# Patient Record
Sex: FEMALE | Race: WHITE | Employment: FULL TIME | URBAN - METROPOLITAN AREA
[De-identification: names, ages, dates, MRNs, and addresses within clinical notes are randomized per-mention and may not be internally consistent; named-entity substitution may affect disease eponyms.]

---

## 2017-01-13 ENCOUNTER — TELEPHONE (OUTPATIENT)
Dept: FAMILY MEDICINE | Facility: CLINIC | Age: 52
End: 2017-01-13

## 2017-01-13 DIAGNOSIS — Z00.00 ROUTINE GENERAL MEDICAL EXAMINATION AT A HEALTH CARE FACILITY: Primary | ICD-10-CM

## 2017-01-13 NOTE — TELEPHONE ENCOUNTER
Reason for Call: Request for an order or referral:    Order or referral being requested: fasting labs, thyroid test, dexa    Date needed: before my next appointment (next appt 1/31/17)    Has the patient been seen by the PCP for this problem? YES    Additional comments: Patient is scheduled for a physical 1/31. She would like to come in earlier than that to have labs drawn and would like a Dexascan ordered. She is also wondering if there is anything else that she should have done prior to her appointment. Please call and advise.     Phone number Patient can be reached at: [phone]    Best Time:  anytime    Can we leave a detailed message on this number?  YES    Mary Alvarenga,   [facility]

## 2017-01-17 NOTE — TELEPHONE ENCOUNTER
Called and spoke with pt and she states had a Dexa done at 47 and it was borderline low.  Her mother and sister both have osteoporosis and her sister is on medication.

## 2017-04-21 ENCOUNTER — RADIANT APPOINTMENT (OUTPATIENT)
Dept: MAMMOGRAPHY | Facility: CLINIC | Age: 52
End: 2017-04-21
Attending: INTERNAL MEDICINE
Payer: COMMERCIAL

## 2017-04-21 DIAGNOSIS — Z12.31 VISIT FOR SCREENING MAMMOGRAM: ICD-10-CM

## 2017-04-21 PROCEDURE — 77063 BREAST TOMOSYNTHESIS BI: CPT | Mod: TC

## 2017-04-21 PROCEDURE — G0202 SCR MAMMO BI INCL CAD: HCPCS | Mod: TC

## 2017-06-23 ENCOUNTER — OFFICE VISIT (OUTPATIENT)
Dept: RHEUMATOLOGY | Facility: CLINIC | Age: 52
End: 2017-06-23
Payer: COMMERCIAL

## 2017-06-23 VITALS
BODY MASS INDEX: 27.41 KG/M2 | OXYGEN SATURATION: 96 % | WEIGHT: 180.9 LBS | HEART RATE: 67 BPM | HEIGHT: 68 IN | DIASTOLIC BLOOD PRESSURE: 72 MMHG | SYSTOLIC BLOOD PRESSURE: 102 MMHG | TEMPERATURE: 97.9 F

## 2017-06-23 DIAGNOSIS — M65.971 SYNOVITIS OF RIGHT ANKLE: ICD-10-CM

## 2017-06-23 DIAGNOSIS — L40.9 PSORIASIS: Primary | ICD-10-CM

## 2017-06-23 LAB
BASOPHILS # BLD AUTO: 0.1 10E9/L (ref 0–0.2)
BASOPHILS NFR BLD AUTO: 0.8 %
DIFFERENTIAL METHOD BLD: NORMAL
EOSINOPHIL # BLD AUTO: 0.3 10E9/L (ref 0–0.7)
EOSINOPHIL NFR BLD AUTO: 4 %
ERYTHROCYTE [DISTWIDTH] IN BLOOD BY AUTOMATED COUNT: 14 % (ref 10–15)
HCT VFR BLD AUTO: 40.5 % (ref 35–47)
HGB BLD-MCNC: 13.5 G/DL (ref 11.7–15.7)
LYMPHOCYTES # BLD AUTO: 2.1 10E9/L (ref 0.8–5.3)
LYMPHOCYTES NFR BLD AUTO: 29 %
MCH RBC QN AUTO: 29.6 PG (ref 26.5–33)
MCHC RBC AUTO-ENTMCNC: 33.3 G/DL (ref 31.5–36.5)
MCV RBC AUTO: 89 FL (ref 78–100)
MONOCYTES # BLD AUTO: 0.7 10E9/L (ref 0–1.3)
MONOCYTES NFR BLD AUTO: 10.2 %
NEUTROPHILS # BLD AUTO: 4 10E9/L (ref 1.6–8.3)
NEUTROPHILS NFR BLD AUTO: 56 %
PLATELET # BLD AUTO: 313 10E9/L (ref 150–450)
RBC # BLD AUTO: 4.56 10E12/L (ref 3.8–5.2)
WBC # BLD AUTO: 7.2 10E9/L (ref 4–11)

## 2017-06-23 PROCEDURE — 86200 CCP ANTIBODY: CPT | Performed by: INTERNAL MEDICINE

## 2017-06-23 PROCEDURE — 99204 OFFICE O/P NEW MOD 45 MIN: CPT | Performed by: INTERNAL MEDICINE

## 2017-06-23 PROCEDURE — 36415 COLL VENOUS BLD VENIPUNCTURE: CPT | Performed by: INTERNAL MEDICINE

## 2017-06-23 PROCEDURE — 84460 ALANINE AMINO (ALT) (SGPT): CPT | Performed by: INTERNAL MEDICINE

## 2017-06-23 PROCEDURE — 82565 ASSAY OF CREATININE: CPT | Performed by: INTERNAL MEDICINE

## 2017-06-23 PROCEDURE — 84550 ASSAY OF BLOOD/URIC ACID: CPT | Performed by: INTERNAL MEDICINE

## 2017-06-23 PROCEDURE — 85025 COMPLETE CBC W/AUTO DIFF WBC: CPT | Performed by: INTERNAL MEDICINE

## 2017-06-23 PROCEDURE — 84450 TRANSFERASE (AST) (SGOT): CPT | Performed by: INTERNAL MEDICINE

## 2017-06-23 PROCEDURE — 86431 RHEUMATOID FACTOR QUANT: CPT | Performed by: INTERNAL MEDICINE

## 2017-06-23 PROCEDURE — 84443 ASSAY THYROID STIM HORMONE: CPT | Performed by: INTERNAL MEDICINE

## 2017-06-23 RX ORDER — BETAMETHASONE DIPROPIONATE 0.5 MG/G
CREAM TOPICAL 2 TIMES DAILY
Qty: 50 G | Refills: 0 | Status: SHIPPED | OUTPATIENT
Start: 2017-06-23 | End: 2017-07-21

## 2017-06-23 ASSESSMENT — ROUTINE ASSESSMENT OF PATIENT INDEX DATA (RAPID3)
RAPID3 INTERPRETATION: LOW 3.1-6
TOTAL RAPID3 SCORE: 4.8

## 2017-06-23 NOTE — MR AVS SNAPSHOT
After Visit Summary   6/23/2017    Rochelle Gill    MRN: [id number]           Patient Information     Date Of Birth          1965        Visit Information        Provider Department      6/23/2017 2:15 PM Abhay Long MD [facility] [place]        Today's Diagnoses     Psoriasis    -  1    Synovitis of right ankle           Follow-ups after your visit        Additional Services     DERMATOLOGY REFERRAL       Your provider has referred you to: [facility]: Dermatology Clinic [facility] [phone]   [email or web address] Dr. Nehemias Dimas    Please be aware that coverage of these services is subject to the terms and limitations of your health insurance plan.  Call member services at your health plan with any benefit or coverage questions.      Please bring the following with you to your appointment:    (1) Any X-Rays, CTs or MRIs which have been performed.  Contact the facility where they were done to arrange for  prior to your scheduled appointment.    (2) List of current medications  (3) This referral request   (4) Any documents/labs given to you for this referral                  Follow-up notes from your care team     Return in about 4 weeks (around 7/21/2017).      Future tests that were ordered for you today     Open Future Orders        Priority Expected Expires Ordered    XR Joint Aspiration Intermed Right Routine 6/23/2017 6/23/2018 6/23/2017            Who to contact     If you have questions or need follow up information about today's clinic visit or your schedule please contact [facility] [place] directly at [phone].  Normal or non-critical lab and imaging results will be communicated to you by MyChart, letter or phone within 4 business days after the clinic has received the results. If you do not hear from us within 7 days, please contact the clinic through MyChart or phone. If you have a critical or abnormal lab result,  "we will notify you by phone as soon as possible.  Submit refill requests through [employer] or call your pharmacy and they will forward the refill request to us. Please allow 3 business days for your refill to be completed.          Additional Information About Your Visit        [employer] Information     [employer] gives you secure access to your electronic health record. If you see a primary care provider, you can also send messages to your care team and make appointments. If you have questions, please call your primary care clinic.  If you do not have a primary care provider, please call [phone] and they will assist you.        Care EveryWhere ID     This is your Care EveryWhere ID. This could be used by other organizations to access your [facility] records  [id number]        Your Vitals Were     Pulse Temperature Height Pulse Oximetry BMI (Body Mass Index)       67 97.9  F (36.6  C) (Oral) 1.727 m (5' 8\") 96% 27.51 kg/m2        Blood Pressure from Last 3 Encounters:   06/23/17 102/72   12/04/16 102/60   09/02/16 116/68    Weight from Last 3 Encounters:   06/23/17 82.1 kg (180 lb 14.4 oz)   12/04/16 77.1 kg (170 lb)   09/02/16 79.4 kg (175 lb 1.6 oz)              We Performed the Following     ALT     AST     CBC with platelets differential     Creatinine     Cyclic Citrullinated Peptide Antibody IgG     DERMATOLOGY REFERRAL     Rheumatoid factor     TSH     Uric acid          Today's Medication Changes          These changes are accurate as of: 6/23/17  3:06 PM.  If you have any questions, ask your nurse or doctor.               Start taking these medicines.        Dose/Directions    augmented betamethasone dipropionate 0.05 % cream   Commonly known as:  DIPROLENE-AF   Used for:  Psoriasis   Started by:  Abhay Long MD        Apply topically 2 times daily for 14 days   Quantity:  50 g   Refills:  0         Stop taking these medicines if you haven't already. Please contact your care team if you " have questions.     clindamycin 150 MG capsule   Commonly known as:  CLEOCIN   Stopped by:  Abhay Long MD           IBUPROFEN PO   Stopped by:  Abhay Long MD                Where to get your medicines      These medications were sent to [facility] [place] - [facility] - [address]   [address], [place] [address]     Phone:  [phone]     augmented betamethasone dipropionate 0.05 % cream                Primary Care Provider Office Phone # Fax #    Lexie Alice Steinberg [phone]       [facility] [address] [address] [facility] [address]        Equal Access to Services     [facility] : Hadii viridiana ku hadasho Soomaali, waaxda luqadaha, qaybta kaalmada adeegyada, sanket boyle . So [facility] [phone].    ATENCIÓN: Si habla español, tiene a schwartz disposición servicios gratuitos de asistencia lingüística. Llame al [phone].    We comply with applicable federal civil rights laws and [place] laws. We do not discriminate on the basis of race, color, national origin, age, disability sex, sexual orientation or gender identity.            Thank you!     Thank you for choosing [facility]  for your care. Our goal is always to provide you with excellent care. Hearing back from our patients is one way we can continue to improve our services. Please take a few minutes to complete the written survey that you may receive in the mail after your visit with us. Thank you!             Your Updated Medication List - Protect others around you: Learn how to safely use, store and throw away your medicines at www.disposemymeds.org.          This list is accurate as of: 6/23/17  3:06 PM.  Always use your most recent med list.                   Brand Name Dispense Instructions for use Diagnosis    augmented betamethasone dipropionate 0.05 % cream    DIPROLENE-AF    50 g    Apply topically 2 times  daily for 14 days    Psoriasis

## 2017-06-23 NOTE — NURSING NOTE
"Chief Complaint   Patient presents with     [facility] Care       Initial /72 (BP Location: Right arm, Patient Position: Chair, Cuff Size: Adult Regular)  Pulse 67  Temp 97.9  F (36.6  C) (Oral)  Ht 1.727 m (5' 8\")  Wt 82.1 kg (180 lb 14.4 oz)  SpO2 96%  BMI 27.51 kg/m2 Estimated body mass index is 27.51 kg/(m^2) as calculated from the following:    Height as of this encounter: 1.727 m (5' 8\").    Weight as of this encounter: 82.1 kg (180 lb 14.4 oz).  Medication Reconciliation: complete    Hx of psoriasis, has got worse than it has ever been   Onset: 5 years ago  Involved joints: right ankle  Pain scale:  2/10     Wakes the patient from sleep : Yes  Morning stiffness:Yes for 60 minutes  Meds used:none    Interim history  Since last visit:  1. Infections - No  2. New symptoms/medical problem - No  3. Any side effects from Rheum medications -NA  3. ER visits/Hospitalizations/surgeries - No  4. Last PCP visit: 1/21/16  Wt Readings from Last 4 Encounters:   06/23/17 82.1 kg (180 lb 14.4 oz)   12/04/16 77.1 kg (170 lb)   09/02/16 79.4 kg (175 lb 1.6 oz)   06/29/16 79.4 kg (175 lb 1.6 oz)     BP Readings from Last 3 Encounters:   06/23/17 102/72   12/04/16 102/60   09/02/16 116/68       "

## 2017-06-23 NOTE — PROGRESS NOTES
[facility] [place] - Rheumatology Clinic Visit     Rochelle Gill MRN# [id number]   YOB: 1965    Primary care provider: Lexie Steinberg  Jun 23, 2017          Assessment and Plan:   # Right ankle synovitis - onset late 2016  # Psoriasis since 2012; flare since mid 2016    Her flare of psoriasis coincides with new job in 2016.   We discussed that the right ankle synovitis is likely from psoriatic arthritis. However we would rule out Rheumatoid and gout. We discussed that untreated PsA may increase risk of CAD.     Patient dislikes going to MD or take chronic medications.     We discussed treatment plan: Topical steroid cream for psoriasis, right ankle cortisone injection (under xray guidance because of edema) and dermatology referral for psoriasis.     The labs from patient records are reviewed.     I will be back in touch with the patient through mychart/letter when results are available.     Return in about 4 weeks (around 7/21/2017).    Orders Placed This Encounter   Procedures     XR Joint Aspiration Intermed Right     CBC with platelets differential     AST     ALT     Creatinine     TSH     Rheumatoid factor     Cyclic Citrullinated Peptide Antibody IgG     Uric acid     DERMATOLOGY REFERRAL       Medications Discontinued During This Encounter   Medication Reason     clindamycin (CLEOCIN) 150 MG capsule      IBUPROFEN PO      Current Outpatient Prescriptions   Medication Sig Dispense Refill     augmented betamethasone dipropionate (DIPROLENE-AF) 0.05 % cream Apply topically 2 times daily for 14 days 50 g 0       Abhay Long MD  [facility] [place] [facility]          Active Problem List:     Patient Active Problem List    Diagnosis Date Noted     Right foot pain 10/27/2016     Priority: Medium     Family history of thyroid disease 01/21/2016     Priority: Medium     mother and sister.       Psoriasis 01/21/2016     Priority: Medium     bilateral elbows, left knee, right dorsum of foot; no thickened  plaque, no meds; sun helps              History of Present Illness:     Chief Complaint   Patient presents with     Establish Care       Jun 23, 2017    Psoriasis:  Since 2012.   Worse since foot fracture in May 2016.     Hx of psoriasis, has got worse than it has ever been   Onset: 5 years ago  Involved joints: right ankle  Mild low back pain and stiffness in AM (long distance in car makes her low back stiff)  Pain scale:  2/10     Wakes the patient from sleep : Yes  Morning stiffness:Yes for 60 minutes  Meds used:none     Interim history  Since last visit:  1. Infections - No  2. New symptoms/medical problem - No  3. Any side effects from Rheum medications -NA  3. ER visits/Hospitalizations/surgeries - No  4. Last PCP visit: 1/21/16    Father had gout  Mother had chron's disease.     No h/o fatigue, unintentional weight loss, loss of appetite, fevers,swollen glands  No family or personal history of ulcerative colitis disease. No h/o iritis.    Patient denies any raynauds, photosensitivity  No h/o recurrent miscarriages or arterial/venous thrombosis in the past  No h/o persistent shortness of breath, cough, chest pain  No h/o persistent nausea, vomiting, constipation, diarrhea, abdominal pain  No h/o hematochezia, hematuria, hemoptysis, hematemesis  No h/o seizures   No h/o cancer    BP Readings from Last 3 Encounters:   06/23/17 102/72   12/04/16 102/60   09/02/16 116/68              Review of Systems:   Complete ROS negative except for symptoms mentioned in the HPI          Past Medical History:     Past Medical History:   Diagnosis Date     NO ACTIVE PROBLEMS      Past Surgical History:   Procedure Laterality Date     NO HISTORY OF SURGERY              Social History:     Social History     Occupational History     Not on file.     Social History Main Topics     Smoking status: Former Smoker     Types: Cigarettes     Start date: 9/1/1982     Quit date: 6/1/1989     Smokeless tobacco: Never Used     Alcohol use  "Yes      Comment: 3-5 glasses of wine per week     Drug use: No     Sexual activity: No            Family History:     Family History   Problem Relation Age of Onset     Lung Cancer Mother      age 62;  2006     Other - See Comments Mother      Chrohns disease     Thyroid Disease Sister             Allergies:     Allergies   Allergen Reactions     Latex      Patient does not think she is allergic to latex             Medications:     Current Outpatient Prescriptions   Medication Sig Dispense Refill     augmented betamethasone dipropionate (DIPROLENE-AF) 0.05 % cream Apply topically 2 times daily for 14 days 50 g 0            Physical Exam:   Blood pressure 102/72, pulse 67, temperature 97.9  F (36.6  C), temperature source Oral, height 1.727 m (5' 8\"), weight 82.1 kg (180 lb 14.4 oz), SpO2 96 %.  Wt Readings from Last 4 Encounters:   17 82.1 kg (180 lb 14.4 oz)   16 77.1 kg (170 lb)   16 79.4 kg (175 lb 1.6 oz)   16 79.4 kg (175 lb 1.6 oz)       Constitutional: well-developed, appearing stated age; cooperative  Eyes: PERRLA, normal conjunctiva, sclera  ENT: nl external ears, nose, lips.No mucous membrane lesions, normal saliva pool  Neck: no cervical lymphadenopathy  Resp: lungs clear to auscultation,   CV: RRR, no added sounds, no edema  GI: Abdomen soft and no tenderness  : not tested  Lymph: no cervical, supraclavicular or epitrochlear nodes  MS: Right ankle swelling present. ROM full.   All shoulder, elbow, wrist, MCP/PIP/DIP, hip, knee, and foot MTP/IP joints were examined and  found normal. No active synovitis or deformity. Full ROM.  Fist 100%.  No dactylitis,  tenosynovitis, enthesopathy.  Skin: large psoriatic plaques knee level and below knee level; some areas are raw and weeping.   no nail pitting; no rash in exposed areas  Psych: nl judgement, orientation, memory, affect.         Data:     Results for orders placed or performed in visit on 17   CBC with platelets " differential   Result Value Ref Range    WBC 7.2 4.0 - 11.0 10e9/L    RBC Count 4.56 3.8 - 5.2 10e12/L    Hemoglobin 13.5 11.7 - 15.7 g/dL    Hematocrit 40.5 35.0 - 47.0 %    MCV 89 78 - 100 fl    MCH 29.6 26.5 - 33.0 pg    MCHC 33.3 31.5 - 36.5 g/dL    RDW 14.0 10.0 - 15.0 %    Platelet Count 313 150 - 450 10e9/L    Diff Method Automated Method     % Neutrophils 56.0 %    % Lymphocytes 29.0 %    % Monocytes 10.2 %    % Eosinophils 4.0 %    % Basophils 0.8 %    Absolute Neutrophil 4.0 1.6 - 8.3 10e9/L    Absolute Lymphocytes 2.1 0.8 - 5.3 10e9/L    Absolute Monocytes 0.7 0.0 - 1.3 10e9/L    Absolute Eosinophils 0.3 0.0 - 0.7 10e9/L    Absolute Basophils 0.1 0.0 - 0.2 10e9/L       Abhay Long MD    [facility]

## 2017-06-24 LAB
ALT SERPL W P-5'-P-CCNC: 27 U/L (ref 0–50)
AST SERPL W P-5'-P-CCNC: 25 U/L (ref 0–45)
CCP AB SER IA-ACNC: 1 U/ML
CREAT SERPL-MCNC: 0.78 MG/DL (ref 0.52–1.04)
GFR SERPL CREATININE-BSD FRML MDRD: 77 ML/MIN/1.7M2
TSH SERPL DL<=0.05 MIU/L-ACNC: 2.39 MU/L (ref 0.4–4)
URATE SERPL-MCNC: 5.2 MG/DL (ref 2.6–6)

## 2017-06-26 ENCOUNTER — TELEPHONE (OUTPATIENT)
Dept: DERMATOLOGY | Facility: CLINIC | Age: 52
End: 2017-06-26

## 2017-06-26 LAB — RHEUMATOID FACT SER NEPH-ACNC: <20 IU/ML (ref 0–20)

## 2017-06-26 NOTE — TELEPHONE ENCOUNTER
----- Message from Lizbet Richardsno sent at 6/26/2017  8:43 AM CDT -----  Regarding: Pt referred to Dr. Dimas, pt does not want to see any other provider, the doctor..  Contact: [phone]  Referring this pt indicated she needs to be seen within the next 3 weeks, due to really bad flare- up of psoriasis.  Pt works close by [facility], very flexible, can come quickly, if called.      Please call pt at [phone].    Thank you!    Ritesh MELENDEZ  Please DO NOT send this message and/or reply back to sender.  Call Center Representatives DO NOT respond to messages.

## 2017-06-26 NOTE — TELEPHONE ENCOUNTER
Called Rochelle back to let her know Dr. Dimas does not have any availability until Oct. Offered her to be seen today by Dr. Patterson. Rochelle declined. Rochelle is on a wait list for Dr. Dimas.

## 2017-06-27 DIAGNOSIS — M65.979 SYNOVITIS OF ANKLE: Primary | ICD-10-CM

## 2017-06-27 NOTE — PROGRESS NOTES
Results released to [facility]:  Ruben Byrd,  Basic blood cell counts, liver and kidney function labs within normal limits. Good.   Thyroid test normal. Good.   Gout test and rheumatoid antibodies are normal. Good.   What you have is likely psoriatic arthritis. Hopefully the cortisone injection helps.     Sincerely    Abhay Long MD  [facility]

## 2017-07-18 NOTE — PROGRESS NOTES
[place] - Rheumatology Clinic Visit     Rochelle Gill MRN# [id number]   YOB: 1965    Primary care provider: Lexie Steinberg  Jul 21, 2017          Assessment and Plan:   # Psoriatic arthritis with right ankle synovitis  - onset late 2016  # Psoriasis since 2012; flare since mid 2016    Her flare of psoriasis coincides with new job in 2016.   We discussed that the right ankle synovitis is likely from psoriatic arthritis.  (synovial fluid negative for crystals). RF, CCP negative. We discussed that untreated PsA may increase risk of CAD.     PsA- reasonably controlled with cortisone injection into right ankle.     Patient dislikes going to MD or take chronic medications.   I prescribed betamethasone for the widespread psoriasis in legs. Patient would establish care with dermatology likely next week.     The labs from patient records are reviewed.     I will be back in touch with the patient through mychart/letter when results are available.     Return in about 3 months (around 10/21/2017).    No orders of the defined types were placed in this encounter.      Medications Discontinued During This Encounter   Medication Reason     augmented betamethasone dipropionate (DIPROLENE-AF) 0.05 % cream Reorder     Current Outpatient Prescriptions   Medication Sig Dispense Refill     augmented betamethasone dipropionate (DIPROLENE-AF) 0.05 % cream Apply topically 2 times daily 50 g 1       Abhay Long MD  [facility]          Active Problem List:     Patient Active Problem List    Diagnosis Date Noted     Psoriatic arthritis (H) 07/21/2017     Priority: Medium     Right foot pain 10/27/2016     Priority: Medium     Family history of thyroid disease 01/21/2016     Priority: Medium     mother and sister.       Psoriasis 01/21/2016     Priority: Medium     bilateral elbows, left knee, right dorsum of foot; no thickened plaque, no meds; sun helps              History of Present Illness:      Chief Complaint   Patient presents with     RECHECK       Jul 21, 2017    Psoriasis:  Since 2012.   Worse since foot fracture in May 2016.     Hx of psoriasis, has got worse than it has ever been   Onset: 5 years ago  Involved joints: right ankle  Mild low back pain and stiffness in AM (long distance in car makes her low back stiff)  Pain scale:  2/10     Wakes the patient from sleep : Yes  Morning stiffness:Yes for 60 minutes  Meds used:none     Interim history  Since last visit:  1. Infections - No  2. New symptoms/medical problem - No  3. Any side effects from Rheum medications -NA  3. ER visits/Hospitalizations/surgeries - No  4. Last PCP visit: 1/21/16    Father had gout  Mother had chron's disease.     No h/o fatigue, unintentional weight loss, loss of appetite, fevers,swollen glands  No family or personal history of ulcerative colitis disease. No h/o iritis.    Patient denies any raynauds, photosensitivity  No h/o recurrent miscarriages or arterial/venous thrombosis in the past  No h/o persistent shortness of breath, cough, chest pain  No h/o persistent nausea, vomiting, constipation, diarrhea, abdominal pain  No h/o hematochezia, hematuria, hemoptysis, hematemesis  No h/o seizures   No h/o cancer    July 21, 2017  Basic blood cell counts, liver and kidney function labs within normal limits. Good.   Thyroid test normal. Good.   Gout test and rheumatoid antibodies are normal. Good.     S/p cortisone injection in right ankle couple of day ago.     Joint pain history  Onset: summer 2016  Involved joints: no changes  Pain scale:  1/10     Wakes the patient from sleep : No  Morning stiffness:Yes for 20 minutes  Meds used: NONE     Interim history  Since last visit:  1. Infections - Yes, from root canal; finished amox   2. New symptoms/medical problem - No  3. Any side effects from Rheum medications - NONE  3. ER visits/Hospitalizations/surgeries - No  4. Last PCP visit: 1/21/2016    BP Readings from Last 3  Encounters:   17 128/70   17 102/72   16 102/60              Review of Systems:   Complete ROS negative except for symptoms mentioned in the HPI          Past Medical History:     Past Medical History:   Diagnosis Date     NO ACTIVE PROBLEMS      Past Surgical History:   Procedure Laterality Date     NO HISTORY OF SURGERY              Social History:     Social History     Occupational History     Not on file.     Social History Main Topics     Smoking status: Former Smoker     Types: Cigarettes     Start date: 1982     Quit date: 1989     Smokeless tobacco: Never Used     Alcohol use Yes      Comment: 3-5 glasses of wine per week     Drug use: No     Sexual activity: No            Family History:     Family History   Problem Relation Age of Onset     Lung Cancer Mother      age 62;  2006     Other - See Comments Mother      Chrohns disease     Thyroid Disease Sister             Allergies:     Allergies   Allergen Reactions     Latex      Patient does not think she is allergic to latex             Medications:     Current Outpatient Prescriptions   Medication Sig Dispense Refill     augmented betamethasone dipropionate (DIPROLENE-AF) 0.05 % cream Apply topically 2 times daily 50 g 1            Physical Exam:   Blood pressure 128/70, pulse 53, weight 82.1 kg (181 lb), SpO2 97 %.  Wt Readings from Last 4 Encounters:   17 82.1 kg (181 lb)   17 82.1 kg (180 lb 14.4 oz)   16 77.1 kg (170 lb)   16 79.4 kg (175 lb 1.6 oz)       Constitutional: well-developed, appearing stated age; cooperative  Eyes: PERRLA, normal conjunctiva, sclera  ENT: nl external ears, nose, lips.No mucous membrane lesions, normal saliva pool  Neck: no cervical lymphadenopathy  Resp: lungs clear to auscultation,   CV: RRR, no added sounds, no edema  GI: Abdomen soft and no tenderness  : not tested  Lymph: no cervical, supraclavicular or epitrochlear nodes  MS: Right ankle swelling present but  significantly better than last visit.  ROM full.   All shoulder, elbow, wrist, MCP/PIP/DIP, hip, knee, and foot MTP/IP joints were examined and  found normal. No active synovitis or deformity. Full ROM.  Fist 100%.  No dactylitis,  tenosynovitis, enthesopathy.  Skin: large psoriatic plaques knee level and below knee level; some areas are raw and weeping.   no nail pitting; no rash in exposed areas  Psych: nl judgement, orientation, memory, affect.         Data:     Results for orders placed or performed in visit on 07/19/17   XR Joint Aspiration Intermed Right    Narrative    Fluoroscopy guided right ankle aspiration and steroid injection  7/19/2017 1:25 PM    History: Right ankle pain    Comparison: MRI 9/14/2016, radiographs 9/1/2016    Fluoroscopy time: 0.1 minutes.    Technique/findings:    The benefits and risks of the procedure were discussed with the  patient including the risks of bleeding, infection, and potential  nerve damage. A written informed consent was obtained. Standard  timeout was performed.    Under the fluoroscopic guidance, patient's area over the right ankle  was marked. This area was prepped and draped using standard sterile  fashion. Approximately 1-3 cc of 1% lidocaine was used for the purpose  of local anesthesia.    Under the fluoroscopic guidance, a 22-gauge 1.5 inch inch  needle was  advanced. Approximately 0.1 mL of clear yellow fluid was aspirated.    Approximately 0.5 cc of Isovue 200 was injected to confirm the  intra-articular location of the needle tip. Subsequently, 1 cc steroid  (40mg/cc Kenalog) and 2 mL of bupivacaine 0.25% was injected. The  needle was then removed.    The pressure was held approximately 1-2 minutes. Sterile dressing was  applied. There was no immediate post procedural complication.    The patient rated preprocedure pain as 2/10.  After procedure this was  rated as 0/10.      Impression    Impression:   1. Successful right ankle aspiration with 0.1 mL of  clear-yellow fluid  sent for laboratory analysis.  2. Uncomplicated right ankle steroid injection.  3. The patient rated preprocedure pain as 2/10.  After procedure this  was rated as 0/10.    I, KATIE LOZOYA MD, attest that I was present in the procedure room  for the entire procedure.    I have personally reviewed the examination and initial interpretation  and I agree with the findings.    KATIE LOZOYA MD   Cell count with differential fluid   Result Value Ref Range    Body Fluid Analysis Source Right Ankle  SYNOVIAL FLUID      Crystal ID synovial fluid   Result Value Ref Range    Crystal Analysis Absent  No crystals seen   ABS   Gram stain   Result Value Ref Range    Specimen Description Right Ankle Synovial fluid     Gram Stain       Canceled, Test credited  Unsatisfactory specimen  Quantity not sufficient  Notification of test cancellation was given to Ben Barry.7/19/17 at 1220.TV.      Micro Report Status FINAL 07/19/2017        Abhay Long MD    [facility]

## 2017-07-21 ENCOUNTER — OFFICE VISIT (OUTPATIENT)
Dept: RHEUMATOLOGY | Facility: CLINIC | Age: 52
End: 2017-07-21
Payer: COMMERCIAL

## 2017-07-21 VITALS
DIASTOLIC BLOOD PRESSURE: 70 MMHG | BODY MASS INDEX: 27.52 KG/M2 | SYSTOLIC BLOOD PRESSURE: 128 MMHG | WEIGHT: 181 LBS | HEART RATE: 53 BPM | OXYGEN SATURATION: 97 %

## 2017-07-21 DIAGNOSIS — L40.50 PSORIATIC ARTHRITIS (H): Primary | ICD-10-CM

## 2017-07-21 DIAGNOSIS — L40.9 PSORIASIS: ICD-10-CM

## 2017-07-21 PROCEDURE — 99213 OFFICE O/P EST LOW 20 MIN: CPT | Performed by: INTERNAL MEDICINE

## 2017-07-21 RX ORDER — BETAMETHASONE DIPROPIONATE 0.5 MG/G
CREAM TOPICAL 2 TIMES DAILY
Qty: 50 G | Refills: 1 | Status: SHIPPED | OUTPATIENT
Start: 2017-07-21 | End: 2018-01-11

## 2017-07-21 NOTE — PATIENT INSTRUCTIONS
Patient Education    Methotrexate Sodium Oral tablet    Methotrexate Sodium Solution for injection    Methotrexate Solution for injection  Methotrexate Sodium Oral tablet  What is this medicine?  METHOTREXATE (METH oh TREX ate) is a chemotherapy drug. This medicine affects cells that are rapidly growing, such as cancer cells and cells in your mouth and stomach. It is used to treat many cancers and other medical conditions. It is used for leukemias, lymphomas, breast cancer, lung cancer, head and neck cancers, and other cancers. This medicine also works on the immune system and is commonly used to treat psoriasis and rheumatoid arthritis. If used for arthritis or psoriasis, the drug is only given once a week.  This medicine may be used for other purposes; ask your health care provider or pharmacist if you have questions.  What should I tell my health care provider before I take this medicine?  They need to know if you have any of these conditions:    bleeding or blood disorders    HIV-positive or have acquired immunodeficiency syndrome (AIDS)    if you frequently drink alcohol-containing drinks    infection or weak immune system    kidney disease    liver disease    lung disease    stomach ulcers    ulcerative colitis    an unusual or allergic reaction to methotrexate, other medicines, foods, dyes, or preservatives    pregnant or trying to get pregnant    breast-feeding  How should I use this medicine?  Take this medicine by mouth. Swallow it with a full glass of water. Follow the directions on the prescription label. Do not take your medicine more often than directed. Finish the full course prescribed by your doctor or health care professional. Do not stop taking except on your doctor's advice.  If you take methotrexate for rheumatoid arthritis or psoriasis, the dose is given only once a week. Do not take more frequently.  Talk to your pediatrician regarding the use of this medicine in children. Special care may  be needed.  Overdosage: If you think you have taken too much of this medicine contact a poison control center or emergency room at once.  NOTE: This medicine is only for you. Do not share this medicine with others.  What if I miss a dose?  If you miss a dose, talk with your doctor or health care professional. Do not take double or extra doses. If you vomit after taking a dose, call your doctor or health care professional for advice.  What may interact with this medicine?    antibiotics and other medicines for infections    aspirin and aspirin-like medicines including bismuth subsalicylate (Pepto-Bismol)    NSAIDs, medicines for pain and inflammation, like ibuprofen or naproxen    probenecid    trimetrexate    vaccines  This list may not describe all possible interactions. Give your health care provider a list of all the medicines, herbs, non-prescription drugs, or dietary supplements you use. Also tell them if you smoke, drink alcohol, or use illegal drugs. Some items may interact with your medicine.  What should I watch for while using this medicine?  Visit your doctor or health care professional for checks on your progress. You will need to have regular blood checks. You will also need a chest X-ray before starting the medicine.  If you take the medicine for rheumatoid arthritis or psoriasis, you may not see an improvement in your condition for several weeks.  Do not drink alcohol-containing drinks while taking this medicine. Both alcohol and the medicine may cause damage to your liver.  This medicine may increase your risk of getting an infection. Stay away from people who are sick.  To protect your kidneys, drink water or other fluids as directed while you are taking this medicine.  Both men and women must use effective birth control. Use 2 reliable forms of birth control together. Do not become pregnant while taking this medicine. Women should continue to use birth control until after their first normal  menstrual cycle after stopping the medicine. Call your doctor right away if you think you or your partner might be pregnant. There is a potential for serious side effects to an unborn child. Talk to your health care professional or pharmacist for more information. Do not breast-feed an infant while taking this medicine. Men should continue to use birth control for at least 3 months after stopping the medicine.  If you are going to have surgery or dental work, tell your health care professional that you are taking this medicine.  This medicine can make you more sensitive to the sun. Keep out of the sun. If you cannot avoid being in the sun, wear protective clothing and use sunscreen. Do not use sun lamps or tanning beds/booths.  What side effects may I notice from receiving this medicine?  Side effects that you should report to your doctor or health care professional as soon as possible:    bruising, pinpoint red spots on the skin, black, tarry stools, blood in the urine    changes in vision    diarrhea    difficulty breathing or a dry cough    mouth and throat ulcers    redness, blistering, peeling or loosening of the skin, including inside the mouth    skin rash, hives, or itching    symptoms of infection like fever or chills, cough, sore throat, pain or difficulty passing urine    unusually weak or tired, fainting spells    vomiting    yellow coloring of skin or eyes  Side effects that usually do not require medical attention (report to your doctor or health care professional if they continue or are bothersome):    dizziness    drowsiness    loss of appetite    nausea  This list may not describe all possible side effects. Call your doctor for medical advice about side effects. You may report side effects to FDA at 3-781-FDA-1339.  Where should I keep my medicine?  Keep out of the reach of children.  Store at room temperature between 20 and 25 degrees C (68 and 77 degrees F). Protect from light. Throw away any  unused medicine after the expiration date.  NOTE:This sheet is a summary. It may not cover all possible information. If you have questions about this medicine, talk to your doctor, pharmacist, or health care provider. Copyright  2016 [employer] Standard

## 2017-07-21 NOTE — NURSING NOTE
"Chief Complaint   Patient presents with     RECHECK       Initial /70  Pulse 53  Wt 82.1 kg (181 lb)  SpO2 97%  BMI 27.52 kg/m2 Estimated body mass index is 27.52 kg/(m^2) as calculated from the following:    Height as of 6/23/17: 1.727 m (5' 8\").    Weight as of this encounter: 82.1 kg (181 lb).  Medication Reconciliation: complete    Joint pain history  Onset: summer 2016  Involved joints: no changes  Pain scale:  1/10     Wakes the patient from sleep : No  Morning stiffness:Yes for 20 minutes  Meds used: NONE    Interim history  Since last visit:  1. Infections - Yes, from root canal; finished amox   2. New symptoms/medical problem - No  3. Any side effects from Rheum medications - NONE  3. ER visits/Hospitalizations/surgeries - No  4. Last PCP visit: 1/21/2016    Wt Readings from Last 4 Encounters:   07/21/17 82.1 kg (181 lb)   06/23/17 82.1 kg (180 lb 14.4 oz)   12/04/16 77.1 kg (170 lb)   09/02/16 79.4 kg (175 lb 1.6 oz)     BP Readings from Last 3 Encounters:   07/21/17 128/70   06/23/17 102/72   12/04/16 102/60       "

## 2017-07-21 NOTE — MR AVS SNAPSHOT
After Visit Summary   7/21/2017    Rochelle Gill    MRN: [id number]           Patient Information     Date Of Birth          1965        Visit Information        Provider Department      7/21/2017 3:45 PM Abhay Long [place] [facility] [place] Instructions      Patient Education    Methotrexate Sodium Oral tablet    Methotrexate Sodium Solution for injection    Methotrexate Solution for injection  Methotrexate Sodium Oral tablet  What is this medicine?  METHOTREXATE (METH oh TREX ate) is a chemotherapy drug. This medicine affects cells that are rapidly growing, such as cancer cells and cells in your mouth and stomach. It is used to treat many cancers and other medical conditions. It is used for leukemias, lymphomas, breast cancer, lung cancer, head and neck cancers, and other cancers. This medicine also works on the immune system and is commonly used to treat psoriasis and rheumatoid arthritis. If used for arthritis or psoriasis, the drug is only given once a week.  This medicine may be used for other purposes; ask your health care provider or pharmacist if you have questions.  What should I tell my health care provider before I take this medicine?  They need to know if you have any of these conditions:    bleeding or blood disorders    HIV-positive or have acquired immunodeficiency syndrome (AIDS)    if you frequently drink alcohol-containing drinks    infection or weak immune system    kidney disease    liver disease    lung disease    stomach ulcers    ulcerative colitis    an unusual or allergic reaction to methotrexate, other medicines, foods, dyes, or preservatives    pregnant or trying to get pregnant    breast-feeding  How should I use this medicine?  Take this medicine by mouth. Swallow it with a full glass of water. Follow the directions on the prescription label. Do not take your medicine more often than directed. Finish the full course prescribed by your doctor  or health care professional. Do not stop taking except on your doctor's advice.  If you take methotrexate for rheumatoid arthritis or psoriasis, the dose is given only once a week. Do not take more frequently.  Talk to your pediatrician regarding the use of this medicine in children. Special care may be needed.  Overdosage: If you think you have taken too much of this medicine contact a poison control center or emergency room at once.  NOTE: This medicine is only for you. Do not share this medicine with others.  What if I miss a dose?  If you miss a dose, talk with your doctor or health care professional. Do not take double or extra doses. If you vomit after taking a dose, call your doctor or health care professional for advice.  What may interact with this medicine?    antibiotics and other medicines for infections    aspirin and aspirin-like medicines including bismuth subsalicylate (Pepto-Bismol)    NSAIDs, medicines for pain and inflammation, like ibuprofen or naproxen    probenecid    trimetrexate    vaccines  This list may not describe all possible interactions. Give your health care provider a list of all the medicines, herbs, non-prescription drugs, or dietary supplements you use. Also tell them if you smoke, drink alcohol, or use illegal drugs. Some items may interact with your medicine.  What should I watch for while using this medicine?  Visit your doctor or health care professional for checks on your progress. You will need to have regular blood checks. You will also need a chest X-ray before starting the medicine.  If you take the medicine for rheumatoid arthritis or psoriasis, you may not see an improvement in your condition for several weeks.  Do not drink alcohol-containing drinks while taking this medicine. Both alcohol and the medicine may cause damage to your liver.  This medicine may increase your risk of getting an infection. Stay away from people who are sick.  To protect your kidneys, drink  water or other fluids as directed while you are taking this medicine.  Both men and women must use effective birth control. Use 2 reliable forms of birth control together. Do not become pregnant while taking this medicine. Women should continue to use birth control until after their first normal menstrual cycle after stopping the medicine. Call your doctor right away if you think you or your partner might be pregnant. There is a potential for serious side effects to an unborn child. Talk to your health care professional or pharmacist for more information. Do not breast-feed an infant while taking this medicine. Men should continue to use birth control for at least 3 months after stopping the medicine.  If you are going to have surgery or dental work, tell your health care professional that you are taking this medicine.  This medicine can make you more sensitive to the sun. Keep out of the sun. If you cannot avoid being in the sun, wear protective clothing and use sunscreen. Do not use sun lamps or tanning beds/booths.  What side effects may I notice from receiving this medicine?  Side effects that you should report to your doctor or health care professional as soon as possible:    bruising, pinpoint red spots on the skin, black, tarry stools, blood in the urine    changes in vision    diarrhea    difficulty breathing or a dry cough    mouth and throat ulcers    redness, blistering, peeling or loosening of the skin, including inside the mouth    skin rash, hives, or itching    symptoms of infection like fever or chills, cough, sore throat, pain or difficulty passing urine    unusually weak or tired, fainting spells    vomiting    yellow coloring of skin or eyes  Side effects that usually do not require medical attention (report to your doctor or health care professional if they continue or are bothersome):    dizziness    drowsiness    loss of appetite    nausea  This list may not describe all possible side effects.  Call your doctor for medical advice about side effects. You may report side effects to FDA at 1-446-MAE-2050.  Where should I keep my medicine?  Keep out of the reach of children.  Store at room temperature between 20 and 25 degrees C (68 and 77 degrees F). Protect from light. Throw away any unused medicine after the expiration date.  NOTE:This sheet is a summary. It may not cover all possible information. If you have questions about this medicine, talk to your doctor, pharmacist, or health care provider. Copyright  2016 Gold Standard                Follow-ups after your visit        Follow-up notes from your care team     Return in about 3 months (around 10/21/2017).      Who to contact     If you have questions or need follow up information about today's clinic visit or your schedule please contact [facility] directly at [phone].  Normal or non-critical lab and imaging results will be communicated to you by [employer], letter or phone within 4 business days after the clinic has received the results. If you do not hear from us within 7 days, please contact the clinic through [employer] or phone. If you have a critical or abnormal lab result, we will notify you by phone as soon as possible.  Submit refill requests through [employer] or call your pharmacy and they will forward the refill request to us. Please allow 3 business days for your refill to be completed.          Additional Information About Your Visit        [employer] Information     [employer] gives you secure access to your electronic health record. If you see a primary care provider, you can also send messages to your care team and make appointments. If you have questions, please call your primary care clinic.  If you do not have a primary care provider, please call [phone] and they will assist you.        Care EveryWhere ID     This is your Care EveryWhere ID. This could be used by other organizations to access your [facility]  records  [id number]        Your Vitals Were     Pulse Pulse Oximetry BMI (Body Mass Index)             53 97% 27.52 kg/m2          Blood Pressure from Last 3 Encounters:   07/21/17 128/70   06/23/17 102/72   12/04/16 102/60    Weight from Last 3 Encounters:   07/21/17 82.1 kg (181 lb)   06/23/17 82.1 kg (180 lb 14.4 oz)   12/04/16 77.1 kg (170 lb)              Today, you had the following     No orders found for display       Primary Care Provider Office Phone # Fax #    Lexie Alice Steinberg [phone]       [facility] [address] [facility] [address]        Equal Access to Services     NELSON PENDLETON : Hadii viridiana ross hadasho Soomaali, waaxda luqadaha, qaybta kaalmada adeegyada, sanket ngin claire boyle . So [facility] [phone].    ATENCIÓN: Si habla español, tiene a schwartz disposición servicios gratuitos de asistencia lingüística. Llame al [phone].    We comply with applicable federal civil rights laws and [place] laws. We do not discriminate on the basis of race, color, national origin, age, disability sex, sexual orientation or gender identity.            Thank you!     Thank you for choosing [facility]  for your care. Our goal is always to provide you with excellent care. Hearing back from our patients is one way we can continue to improve our services. Please take a few minutes to complete the written survey that you may receive in the mail after your visit with us. Thank you!             Your Updated Medication List - Protect others around you: Learn how to safely use, store and throw away your medicines at www.disposemymeds.org.      Notice  As of 7/21/2017  4:07 PM    You have not been prescribed any medications.

## 2017-08-01 ENCOUNTER — TELEPHONE (OUTPATIENT)
Dept: DERMATOLOGY | Facility: CLINIC | Age: 52
End: 2017-08-01

## 2017-08-01 NOTE — TELEPHONE ENCOUNTER
Patient called because she saw Dr. Rico in [place] and and he suggested she see you because of a psoriasis flare-up. Can we schedule her in one of your BOOM slots on September 21st?    Let us know.    Christiana

## 2017-09-21 ENCOUNTER — OFFICE VISIT (OUTPATIENT)
Dept: DERMATOLOGY | Facility: CLINIC | Age: 52
End: 2017-09-21

## 2017-09-21 DIAGNOSIS — L28.0 LICHEN SIMPLEX CHRONICUS: ICD-10-CM

## 2017-09-21 DIAGNOSIS — B35.3 TINEA PEDIS OF BOTH FEET: ICD-10-CM

## 2017-09-21 DIAGNOSIS — R21 RASH: Primary | ICD-10-CM

## 2017-09-21 RX ORDER — PRENATAL VIT 91/IRON/FOLIC/DHA 28-975-200
COMBINATION PACKAGE (EA) ORAL
Qty: 12 G | Refills: 1 | Status: SHIPPED | OUTPATIENT
Start: 2017-09-21

## 2017-09-21 RX ORDER — BETAMETHASONE DIPROPIONATE 0.5 MG/G
OINTMENT, AUGMENTED TOPICAL
Qty: 50 G | Refills: 3 | Status: SHIPPED | OUTPATIENT
Start: 2017-09-21 | End: 2019-01-30

## 2017-09-21 ASSESSMENT — PAIN SCALES - GENERAL: PAINLEVEL: NO PAIN (0)

## 2017-09-21 NOTE — PROGRESS NOTES
[facility] Dermatology Note      Dermatology Problem List:  1. Lichen simplex chronicus  2. Tinea pedis: Terbinafine cream    Encounter Date: Sep 21, 2017    CC:   Chief Complaint   Patient presents with     Psoriasis     Rochelle is here today for psoriasis          History of Present Illness:  Ms. Rochelle Gill is a 52 year old female who presents in consultation from Dr. Long of rheumatology for evaluation of a rash. The patient describes a prior history of a rash on her elbows that appeared in her 40's (roughly 10 years ago) and was self limited and went away without intervention. However, about 1.5 years ago, she developed what she believes is a similar rash on her bilateral lower legs. This was particularly itchy but she did not seek treatment until encouraged to do so by her sister within the past couple months. She was seen by rheumatology as she had associated joint pain as well. In rheumatology, she has been suspected of having psoriatic arthritis which is responding to joint injections. The patient was given betamethasone valerate cream, which she has recently been using about once per day with significant improvement of her rash. She is happy with the progress of her rash and does is hesitant to pursue any systemic medications for fear of risks. The patient is otherwise feeling well and has no other concerns with regards to her skin at this time.    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 The patient denies use of tanning beds.    Family History:  There is no family history of melanoma or  psoriasis.    Medications:  Current Outpatient Prescriptions   Medication Sig Dispense Refill     augmented betamethasone dipropionate (DIPROLENE-AF) 0.05 % cream Apply topically 2 times daily 50 g 1        Allergies   Allergen Reactions     Latex      Patient does not think she is allergic to latex          Review of Systems:  -As per HPI  -Constitutional: The patient denies fatigue, fevers, chills, unintended weight loss, and night sweats.  -HEENT: Patient denies nonhealing oral sores.  -Skin: As above in HPI. No additional skin concerns.    Physical exam:  Vitals: There were no vitals taken for this visit.  GEN: This is a well developed, well-nourished female in no acute distress, in a pleasant mood.    SKIN: Focused examination of the scalp, face, neck, upper chest, bilateral upper and lower extremities, and gluteal cleft was performed.  - Bilateral lower legs with multiple pale pink to violaceous small papules and larger flat topped plaques. Many of these papules and plaques have overlying excoriations and some, particularly over the ankles, are moderately lichenified.  - Bilateral instep of the plantar feet with annular light pink patches with peripheral scale  -No other lesions of concern on areas examined.     Impression/Plan:  1. Lichen simplex chronicus, lower extremities    I favor the diagnosis of lichen simplex chronicus: Clinically, the differential for this patient's rash includes lichen simplex chronicus vs lichen planus. The location and overall appearance favors LSC, as one would expect lichen planus to be located elsewhere (i.e. Volar wrists). The morphology of her rash and lack of classic locations argues against psoriasis, despite concern for possible psoriatic arthritis. With regards to treatment, she was encouraged to continue betamethasone diproprionate, though was prescribed ointment. With avoidance of scratching and use of betamethasone for 2-4 weeks BID this should be able to be cleared or  nearly cleared.    Begin betamethasone diproprionate 0.05% ointment BID for 2-4 weeks, may occlude with bandage    Return PRN    2. Scaling rash, bilateral plantar feet: This is suspicious for tinea pedis, as such will treat empirically with terbinafine cream.    Apply terbinafine 2% cream BID for 2 weeks          CC Dr. Long on close of this encounter.  Follow-up prn for new or changing lesions.       Dr. Dimas staffed the patient.    Staff Involved:  Resident(Rohit Burgess)/Staff(as above)     I have seen, evaluated and discussed the patient with resident physician. I have reviewed the resident physicians note and agree with their clinical findings, assessment and plan.  Appropriate changes to the resident's note have been made.    Nehemias Dimas MD, FAAD    Departments of Internal Medicine and Dermatology  [facility]  [phone]

## 2017-09-21 NOTE — PATIENT INSTRUCTIONS
We suspect the rash on your legs is the consequence of chronic itch (lichen simplex chronicus) rather than psoriasis.  Continue to use betamethasone diproprionate 0.05% ointment twice per day for 2-4 weeks until resolved. You may also cover with a bandage to help prevent scratching.    The scaling/flaking on the bottom of your feet is suspicious for Athlete's foot (tinea pedis). Apply terbinafine cream twice per day to the bottom of the feet for 2 weeks.

## 2017-09-21 NOTE — LETTER
9/21/2017       RE: Rochelle Gill  [address] [place] [address]     Dear Colleague,    Thank you for referring your patient, Rochelle Gill, to the [facility] DERMATOLOGY at [facility]. Please see a copy of my visit note below.    [facility] Dermatology Note      Dermatology Problem List:  1. Lichen simplex chronicus  2. Tinea pedis: Terbinafine cream    Encounter Date: Sep 21, 2017    CC:   Chief Complaint   Patient presents with     Psoriasis     Rochelle is here today for psoriasis          History of Present Illness:  Ms. Rochelle Gill is a 52 year old female who presents in consultation from Dr. Long of rheumatology for evaluation of a rash. The patient describes a prior history of a rash on her elbows that appeared in her 40's (roughly 10 years ago) and was self limited and went away without intervention. However, about 1.5 years ago, she developed what she believes is a similar rash on her bilateral lower legs. This was particularly itchy but she did not seek treatment until encouraged to do so by her sister within the past couple months. She was seen by rheumatology as she had associated joint pain as well. In rheumatology, she has been suspected of having psoriatic arthritis which is responding to joint injections. The patient was given betamethasone valerate cream, which she has recently been using about once per day with significant improvement of her rash. She is happy with the progress of her rash and does is hesitant to pursue any systemic medications for fear of risks. The patient is otherwise feeling well and has no other concerns with regards to her skin at this time.    Past Medical History:   Patient Active Problem List   Diagnosis     Family history of thyroid disease     Psoriasis     Right foot pain     Psoriatic arthritis (H)     Past Medical History:   Diagnosis Date     NO ACTIVE PROBLEMS      Past Surgical History:   Procedure  Laterality Date     NO HISTORY OF SURGERY         Social History:  The patient works in patient [employer] here at the [employer]. The patient denies use of tanning beds.    Family History:  There is no family history of melanoma or psoriasis.    Medications:  Current Outpatient Prescriptions   Medication Sig Dispense Refill     augmented betamethasone dipropionate (DIPROLENE-AF) 0.05 % cream Apply topically 2 times daily 50 g 1        Allergies   Allergen Reactions     Latex      Patient does not think she is allergic to latex          Review of Systems:  -As per HPI  -Constitutional: The patient denies fatigue, fevers, chills, unintended weight loss, and night sweats.  -HEENT: Patient denies nonhealing oral sores.  -Skin: As above in HPI. No additional skin concerns.    Physical exam:  Vitals: There were no vitals taken for this visit.  GEN: This is a well developed, well-nourished female in no acute distress, in a pleasant mood.    SKIN: Focused examination of the scalp, face, neck, upper chest, bilateral upper and lower extremities, and gluteal cleft was performed.  - Bilateral lower legs with multiple pale pink to violaceous small papules and larger flat topped plaques. Many of these papules and plaques have overlying excoriations and some, particularly over the ankles, are moderately lichenified.  - Bilateral instep of the plantar feet with annular light pink patches with peripheral scale  -No other lesions of concern on areas examined.     Impression/Plan:  1. Lichen simplex chronicus, lower extremities    I favor the diagnosis of lichen simplex chronicus: Clinically, the differential for this patient's rash includes lichen simplex chronicus vs lichen planus. The location and overall appearance favors LSC, as one would expect lichen planus to be located elsewhere (i.e. Volar wrists). The morphology of her rash and lack of classic locations argues against psoriasis, despite concern for possible  psoriatic arthritis. With regards to treatment, she was encouraged to continue betamethasone diproprionate, though was prescribed ointment. With avoidance of scratching and use of betamethasone for 2-4 weeks BID this should be able to be cleared or nearly cleared.    Begin betamethasone diproprionate 0.05% ointment BID for 2-4 weeks, may occlude with bandage    Return PRN    2. Scaling rash, bilateral plantar feet: This is suspicious for tinea pedis, as such will treat empirically with terbinafine cream.    Apply terbinafine 2% cream BID for 2 weeks          CC Dr. Long on close of this encounter.  Follow-up prn for new or changing lesions.       Dr. Dimas staffed the patient.    Staff Involved:  Resident(Rohit Burgess)/Staff(as above)     I have seen, evaluated and discussed the patient with resident physician. I have reviewed the resident physicians note and agree with their clinical findings, assessment and plan.  Appropriate changes to the resident's note have been made.    Nehemias Dimas MD, FAAD    Departments of Internal Medicine and Dermatology  [facility]  [phone]

## 2017-09-21 NOTE — MR AVS SNAPSHOT
After Visit Summary   9/21/2017    Rochelle Gill    MRN: [id number]           Patient Information     Date Of Birth          1965        Visit Information        Provider Department      9/21/2017 4:15 PM Nehemias Dimas MD [facility]        Today's Diagnoses     Rash    -  1    Lichen simplex chronicus        Tinea pedis of both feet          Care Instructions    We suspect the rash on your legs is the consequence of chronic itch (lichen simplex chronicus) rather than psoriasis.  Continue to use betamethasone diproprionate 0.05% ointment twice per day for 2-4 weeks until resolved. You may also cover with a bandage to help prevent scratching.    The scaling/flaking on the bottom of your feet is suspicious for Athlete's foot (tinea pedis). Apply terbinafine cream twice per day to the bottom of the feet for 2 weeks.          Follow-ups after your visit        Follow-up notes from your care team     Return if symptoms worsen or fail to improve, for Routine Visit.      Your next 10 appointments already scheduled     Oct 20, 2017  9:45 AM CDT   [facility] Long [facility]   [facility]    [address] [place] [address] [phone]              Who to contact     Please call your clinic at [phone] to:    Ask questions about your health    Make or cancel appointments    Discuss your medicines    Learn about your test results    Speak to your doctor   If you have compliments or concerns about an experience at your clinic, or if you wish to file a complaint, please contact [facility] Patient Relations at [phone] or email us at [email or web address]         Additional Information About Your Visit        MyChart Information     [employer] gives you secure access to your electronic health record. If you see a primary care provider, you can also send messages to your care  team and make appointments. If you have questions, please call your primary care clinic.  If you do not have a primary care provider, please call [phone] and they will assist you.      [employer] is an electronic gateway that provides easy, online access to your medical records. With [employer], you can request a clinic appointment, read your test results, renew a prescription or communicate with your care team.     To access your existing account, please contact your [facility] or call [phone] for assistance.        Care EveryWhere ID     This is your Care EveryWhere ID. This could be used by other organizations to access your [place] medical records  [id number]         Blood Pressure from Last 3 Encounters:   07/21/17 128/70   06/23/17 102/72   12/04/16 102/60    Weight from Last 3 Encounters:   07/21/17 82.1 kg (181 lb)   06/23/17 82.1 kg (180 lb 14.4 oz)   12/04/16 77.1 kg (170 lb)              Today, you had the following     No orders found for display         Today's Medication Changes          These changes are accurate as of: 9/21/17 11:59 PM.  If you have any questions, ask your nurse or doctor.               Start taking these medicines.        Dose/Directions    terbinafine 1 % cream   Commonly known as:  lamISIL AT   Used for:  Tinea pedis of both feet   Started by:  Nehemias Dimas MD        Apply twice daily to rash on bottom of feet for 2 weeks   Quantity:  12 g   Refills:  1         These medicines have changed or have updated prescriptions.        Dose/Directions    * augmented betamethasone dipropionate 0.05 % cream   Commonly known as:  DIPROLENE-AF   This may have changed:  Another medication with the same name was added. Make sure you understand how and when to take each.   Used for:  Psoriasis, Psoriatic arthritis (H)   Changed by:  Abhay Long MD        Apply topically 2 times daily   Quantity:  50 g   Refills:  1       * augmented  betamethasone dipropionate 0.05 % ointment   Commonly known as:  DIPROLENE-AF   This may have changed:  You were already taking a medication with the same name, and this prescription was added. Make sure you understand how and when to take each.   Used for:  Rash, Lichen simplex chronicus   Changed by:  Nehemias Dimas MD        Apply twice per day as needed to rash on the legs   Quantity:  50 g   Refills:  3       * Notice:  This list has 2 medication(s) that are the same as other medications prescribed for you. Read the directions carefully, and ask your doctor or other care provider to review them with you.         Where to get your medicines      These medications were sent to [facility] [place] - [place] - [address]   [address], [place] [address]     Phone:  [phone]     augmented betamethasone dipropionate 0.05 % ointment    terbinafine 1 % cream                Primary Care Provider Office Phone # Fax #    Lexie Alice Steinberg [phone] [address]  [facility] [address]        Equal Access to Services     [facility]: Hadii viridiana ku hadasho Sovandana, waaxda luqadaha, qaybta kaalmadanii medrano, sanket bauman. So [facility] [phone].    ATENCIÓN: Si habla español, tiene a schwartz disposición servicios gratuitos de asistencia lingüística. Llame al [phone].    We comply with applicable federal civil rights laws and [place] laws. We do not discriminate on the basis of race, color, national origin, age, disability, sex, sexual orientation, or gender identity.            Thank you!     Thank you for choosing [facility] DERMATOLOGY  for your care. Our goal is always to provide you with excellent care. Hearing back from our patients is one way we can continue to improve our services. Please take a few minutes to complete the written survey that you may receive in the mail after your visit with us. Thank  you!             Your Updated Medication List - Protect others around you: Learn how to safely use, store and throw away your medicines at www.disposemymeds.org.          This list is accurate as of: 9/21/17 11:59 PM.  Always use your most recent med list.                   Brand Name Dispense Instructions for use Diagnosis    * augmented betamethasone dipropionate 0.05 % cream    DIPROLENE-AF    50 g    Apply topically 2 times daily    Psoriasis, Psoriatic arthritis (H)       * augmented betamethasone dipropionate 0.05 % ointment    DIPROLENE-AF    50 g    Apply twice per day as needed to rash on the legs    Rash, Lichen simplex chronicus       terbinafine 1 % cream    lamISIL AT    12 g    Apply twice daily to rash on bottom of feet for 2 weeks    Tinea pedis of both feet       * Notice:  This list has 2 medication(s) that are the same as other medications prescribed for you. Read the directions carefully, and ask your doctor or other care provider to review them with you.

## 2017-09-21 NOTE — NURSING NOTE
Dermatology Rooming Note    Rochelle Gill's goals for this visit include:   Chief Complaint   Patient presents with     Psoriasis     Rochelle is here today for psoriasis      NIA Helms

## 2018-01-11 ENCOUNTER — OFFICE VISIT (OUTPATIENT)
Dept: OPTOMETRY | Facility: CLINIC | Age: 53
End: 2018-01-11
Payer: COMMERCIAL

## 2018-01-11 DIAGNOSIS — H52.4 PRESBYOPIA: ICD-10-CM

## 2018-01-11 DIAGNOSIS — H02.88A MEIBOMIAN GLAND DYSFUNCTION (MGD) OF UPPER AND LOWER LIDS OF BOTH EYES: ICD-10-CM

## 2018-01-11 DIAGNOSIS — H02.88B MEIBOMIAN GLAND DYSFUNCTION (MGD) OF UPPER AND LOWER LIDS OF BOTH EYES: ICD-10-CM

## 2018-01-11 DIAGNOSIS — H52.03 HYPERMETROPIA OF BOTH EYES: Primary | ICD-10-CM

## 2018-01-11 DIAGNOSIS — H04.129 DRY EYE: ICD-10-CM

## 2018-01-11 DIAGNOSIS — H17.9 SCAR OF CORNEA OF LEFT EYE: ICD-10-CM

## 2018-01-11 PROCEDURE — 92004 COMPRE OPH EXAM NEW PT 1/>: CPT | Performed by: OPTOMETRIST

## 2018-01-11 PROCEDURE — 92015 DETERMINE REFRACTIVE STATE: CPT | Performed by: OPTOMETRIST

## 2018-01-11 ASSESSMENT — CUP TO DISC RATIO
OS_RATIO: 0.5
OD_RATIO: 0.5

## 2018-01-11 ASSESSMENT — REFRACTION_MANIFEST
OD_SPHERE: +1.25
OS_AXIS: 043
OD_AXIS: 170
OS_ADD: +2.25
OD_SPHERE: +1.00
OS_AXIS: 045
OS_CYLINDER: +1.00
OD_CYLINDER: +0.25
OS_SPHERE: +1.25
OD_ADD: +2.25
METHOD_AUTOREFRACTION: 1
OS_CYLINDER: +0.25
OS_SPHERE: +1.00

## 2018-01-11 ASSESSMENT — VISUAL ACUITY
METHOD: SNELLEN - LINEAR
OS_SC: 20/60
OS_SC: 20/40
OD_SC+: -1
OD_SC: 20/30
OD_SC: 20/60

## 2018-01-11 ASSESSMENT — KERATOMETRY
OS_AXISANGLE2_DEGREES: 169
OD_K1POWER_DIOPTERS: 42.75
OS_K1POWER_DIOPTERS: 41.87
OD_AXISANGLE_DEGREES: 94
OS_K2POWER_DIOPTERS: 43.62
OD_K2POWER_DIOPTERS: 43.62
OD_AXISANGLE2_DEGREES: 4
METHOD_AUTO_MANUAL: AUTOMATED
OS_AXISANGLE_DEGREES: 79

## 2018-01-11 ASSESSMENT — CONF VISUAL FIELD
OS_NORMAL: 1
OD_NORMAL: 1

## 2018-01-11 ASSESSMENT — TONOMETRY
IOP_METHOD: APPLANATION
OS_IOP_MMHG: 16
OD_IOP_MMHG: 16

## 2018-01-11 ASSESSMENT — EXTERNAL EXAM - RIGHT EYE: OD_EXAM: NORMAL

## 2018-01-11 ASSESSMENT — EXTERNAL EXAM - LEFT EYE: OS_EXAM: NORMAL

## 2018-01-11 NOTE — MR AVS SNAPSHOT
After Visit Summary   1/11/2018    Rochelle Gill    MRN: [id number]           Patient Information     Date Of Birth          1965        Visit Information        Provider Department      1/11/2018 8:40 AM Lizbet Coffey, [facility] [place]        Today's Diagnoses     Hypermetropia of both eyes    -  1    Presbyopia        Dry eye        Scar of cornea of left eye        Meibomian gland dysfunction (MGD) of upper and lower lids of both eyes          Care Instructions     DRY EYE TREATMENT    I recommend using artificial tears for your dry eye. There are over the counter drops that work well and may be used up to 4x daily. ( systane balance, refresh optive, soothe xp)   If you need more than 4 drops daily, use a preservative free product which come in individual vials which may be used for 24 hours and discarded.     Artificial tears work best as a preventative and not as well after your eyes are starting to bother you.  It may take 4- 6 weeks of using the drops before you notice improvement.  If after that time you are still having problems schedule an appointment for an evaluation and discussion of different treatments.  Dry eyes are a chronic condition and you may have more symptoms at certain times of the year.      Additional recommended treatment:  Warm compresses once to twice daily for 5-10 minutes    Directions for warm soaks  There are few methods for hot compresses. Moisten a washcloth with hot water, or microwave for 10 seconds, being careful to not get the cloth too hot.   Then put the washcloth onto your eyelids for 5 minutes. It will cool quickly so a rice pack or eyemask that can be heated and laid on top of the washcloth will help retain the heat.          Omega 3 fatty acid supplements taken 1-2x daily            Follow-ups after your visit        Who to contact     If you have questions or need follow up information about today's clinic visit or your schedule  please contact [facility] directly at [phone].  Normal or non-critical lab and imaging results will be communicated to you by MyChart, letter or phone within 4 business days after the clinic has received the results. If you do not hear from us within 7 days, please contact the clinic through MyChart or phone. If you have a critical or abnormal lab result, we will notify you by phone as soon as possible.  Submit refill requests through [employer] or call your pharmacy and they will forward the refill request to us. Please allow 3 business days for your refill to be completed.          Additional Information About Your Visit        [facility] [place] [employer] Information     [employer] gives you secure access to your electronic health record. If you see a primary care provider, you can also send messages to your care team and make appointments. If you have questions, please call your primary care clinic.  If you do not have a primary care provider, please call [phone] and they will assist you.        Care EveryWhere ID     This is your Care EveryWhere ID. This could be used by other organizations to access your [place] medical records  [id number]         Blood Pressure from Last 3 Encounters:   07/21/17 128/70   06/23/17 102/72   12/04/16 102/60    Weight from Last 3 Encounters:   07/21/17 82.1 kg (181 lb)   06/23/17 82.1 kg (180 lb 14.4 oz)   12/04/16 77.1 kg (170 lb)              We Performed the Following     EYE EXAM (SIMPLE-NONBILLABLE)     REFRACTION        Primary Care Provider Office Phone # Fax #    Lexie Alice Steinberg [phone]       [address] [facility] [address]        Equal Access to Services     [facility]: Hadii aad ku hadasho Soomaali, waaxda luqadaha, qaybta kaalmada mitchell, sanket bauman. So [facility] [phone].    ATENCIÓN: Si habla español, tiene a schwartz disposición servicios gratuitos de asistencia lingüística. Llame al [phone].    We  comply with applicable federal civil rights laws and [place] laws. We do not discriminate on the basis of race, color, national origin, age, disability, sex, sexual orientation, or gender identity.            Thank you!     Thank you for choosing [facility]  for your care. Our goal is always to provide you with excellent care. Hearing back from our patients is one way we can continue to improve our services. Please take a few minutes to complete the written survey that you may receive in the mail after your visit with us. Thank you!             Your Updated Medication List - Protect others around you: Learn how to safely use, store and throw away your medicines at www.disposemymeds.org.          This list is accurate as of: 1/11/18 10:53 AM.  Always use your most recent med list.                   Brand Name Dispense Instructions for use Diagnosis    augmented betamethasone dipropionate 0.05 % ointment    DIPROLENE-AF    50 g    Apply twice per day as needed to rash on the legs    Rash, Lichen simplex chronicus       terbinafine 1 % cream    lamISIL AT    12 g    Apply twice daily to rash on bottom of feet for 2 weeks    Tinea pedis of both feet

## 2018-01-11 NOTE — PROGRESS NOTES
Chief Complaint   Patient presents with     COMPREHENSIVE EYE EXAM     Blurry vision, itchy, dry allergies.    + rhinitis on antihistamines   Vision not as good especially at near  Mom had glaucoma ,  at age 62, dx in 50s on eye drops  Eyes are always red     Last Eye Exam: 3yrs    Dilated Previously: Yes    What are you currently using to see?  readers       Distance Vision Acuity: Noticed gradual change in both eyes    Near Vision Acuity: Not satisfied     Eye Comfort: dry,itchy redness   Do you use eye drops? : No  Occupation or Hobbies: Computer/Meetings  Patient relations / complaints           Medical, surgical and family histories reviewed and updated 2018.       OBJECTIVE: See Ophthalmology exam    ASSESSMENT:    ICD-10-CM    1. Hypermetropia of both eyes H52.03    2. Presbyopia H52.4    3. Dry eye H04.129    4. Scar of cornea of left eye H17.9         PLAN:   Dry eye treatment , progressive addition lens     Lizbet Coffey OD

## 2018-02-28 ENCOUNTER — NURSE TRIAGE (OUTPATIENT)
Dept: NURSING | Facility: CLINIC | Age: 53
End: 2018-02-28

## 2018-03-01 ENCOUNTER — OFFICE VISIT (OUTPATIENT)
Dept: OPTOMETRY | Facility: CLINIC | Age: 53
End: 2018-03-01
Payer: COMMERCIAL

## 2018-03-01 DIAGNOSIS — H11.31 SUBCONJUNCTIVAL HEMORRHAGE, NON-TRAUMATIC, RIGHT: Primary | ICD-10-CM

## 2018-03-01 PROCEDURE — 92012 INTRM OPH EXAM EST PATIENT: CPT | Performed by: OPTOMETRIST

## 2018-03-01 ASSESSMENT — VISUAL ACUITY
OS_SC: 20/50
OD_SC: 20/40
OD_PH_SC: 20/20 -3
METHOD: SNELLEN - LINEAR
OS_PH_SC: 20/30 -1

## 2018-03-01 ASSESSMENT — CUP TO DISC RATIO
OD_RATIO: 0.55
OS_RATIO: 0.5

## 2018-03-01 ASSESSMENT — EXTERNAL EXAM - RIGHT EYE: OD_EXAM: NORMAL

## 2018-03-01 ASSESSMENT — EXTERNAL EXAM - LEFT EYE: OS_EXAM: NORMAL

## 2018-03-01 NOTE — TELEPHONE ENCOUNTER
"Tonight notice what looked like bleeding in about a third of the white of one eye. Has had a bad headache all day, but not concerned about that. Just wondering about her eye. No other new symptoms at this time.    Piper Krause RN, [facility]    Reason for Disposition    Bleeding on white of the eye    Additional Information    Negative: Chemical got in the eye    Negative: Piece of something got in the eye    Negative: Eye redness followed an eye injury    Negative: Yellow or green pus in the eyes    Negative: [1] Eyelid is swollen AND [2] no redness of white of eye (sclera)    Negative: Caused by pollen or other allergy OR the main symptom is itchy eyes    Negative: Red, widespread rash also present    Negative: Eyelid is very swollen (shut or almost)    Negative: Eyelid is red and painful (or tender to touch)    Negative: Eye pain present > 24 hours    Negative: [1] Bleeding on white of the eye AND [2] taking Coumadin (warfarin) or other strong blood thinner, or known bleeding disorder (e.g., thrombocytopenia)    Negative: Blurred vision    Negative: [1] Eye pain AND [2] more than mild    Negative: Cloudy spot or sore seen on the cornea (clear part of the eye)    Negative: Vomiting    Negative: [1] Recent eye surgery AND [2] increasing eye pain    Negative: Patient sounds very sick or weak to the triager    Negative: Severe eye pain    Negative: [1] Eyelids are very swollen (shut or almost) AND [2] fever    Negative: [1] Eyelid (outer) is very red (or tender to touch) AND [2] fever    Negative: [1] Foreign body sensation (\"feels like something is in there\") AND [2] irrigation didn't help    Protocols used: EYE - RED WITHOUT PUS-ADULT-AH    "

## 2018-03-01 NOTE — MR AVS SNAPSHOT
After Visit Summary   3/1/2018    Rochelle Gill    MRN: [id number]           Patient Information     Date Of Birth          1965        Visit Information        Provider Department      3/1/2018 11:20 AM Lizbet Coffey OD [facility]        Today's Diagnoses     Subconjunctival hemorrhage, non-traumatic, right    -  1       Follow-ups after your visit        Follow-up notes from your care team     Return if symptoms worsen or fail to improve.      Who to contact     If you have questions or need follow up information about today's clinic visit or your schedule please contact [facility] directly at [phone].  Normal or non-critical lab and imaging results will be communicated to you by MyChart, letter or phone within 4 business days after the clinic has received the results. If you do not hear from us within 7 days, please contact the clinic through [employer] or phone. If you have a critical or abnormal lab result, we will notify you by phone as soon as possible.  Submit refill requests through [employer]) or call your pharmacy and they will forward the refill request to us. Please allow 3 business days for your refill to be completed.          Additional Information About Your Visit        MyChart Information     [employer] gives you secure access to your electronic health record. If you see a primary care provider, you can also send messages to your care team and make appointments. If you have questions, please call your primary care clinic.  If you do not have a primary care provider, please call [phone] and they will assist you.        Care EveryWhere ID     This is your Care EveryWhere ID. This could be used by other organizations to access your [place] medical records  [id number]         Blood Pressure from Last 3 Encounters:   07/21/17 128/70   06/23/17 102/72   12/04/16 102/60    Weight from Last 3 Encounters:   07/21/17 82.1 kg (181 lb)   06/23/17 82.1 kg  (180 lb 14.4 oz)   12/04/16 77.1 kg (170 lb)              Today, you had the following     No orders found for display       Primary Care Provider Office Phone # Fax #    Lexie Steinberg [phone] [address] [address] [facility] [address]        Equal Access to Services     [facility] : Hadii aad ku hadasho Soomaali, waaxda luqadaha, qaybta kaalmada adeegyada, waxay idiin hayaan adeeg kharash la'aan . So [facility] [phone].    ATENCIÓN: Si habla español, tiene a schwartz disposición servicios gratuitos de asistencia lingüística. Llame al [phone].    We comply with applicable federal civil rights laws and [place] laws. We do not discriminate on the basis of race, color, national origin, age, disability, sex, sexual orientation, or gender identity.            Thank you!     Thank you for choosing [facility]  for your care. Our goal is always to provide you with excellent care. Hearing back from our patients is one way we can continue to improve our services. Please take a few minutes to complete the written survey that you may receive in the mail after your visit with us. Thank you!             Your Updated Medication List - Protect others around you: Learn how to safely use, store and throw away your medicines at www.disposemymeds.org.          This list is accurate as of 3/1/18 12:04 PM.  Always use your most recent med list.                   Brand Name Dispense Instructions for use Diagnosis    augmented betamethasone dipropionate 0.05 % ointment    DIPROLENE-AF    50 g    Apply twice per day as needed to rash on the legs    Rash, Lichen simplex chronicus       terbinafine 1 % cream    lamISIL AT    12 g    Apply twice daily to rash on bottom of feet for 2 weeks    Tinea pedis of both feet

## 2018-03-01 NOTE — PROGRESS NOTES
Chief Complaint   Patient presents with     Eye Problem Right Eye     Red eye   Woke up with huge headache, treated with ibuprofen, caffeine and acetaminophen   Headache resolved  bp has always been low  No straining, vomiting or lifting  Dry eye is getting better using artificial tears  And taking supplements  IOP 16 at last visit   Do you wear contact lenses? No    HPI    Symptoms:     Redness      Duration:  1 day                 Blood Pressure from Last 3 Encounters:   07/21/17 128/70   06/23/17 102/72   12/04/16 102/60         Lizbet Coffey, OD     See Review Of Systems         Medical, surgical and family histories reviewed and updated 3/1/2018.         OBJECTIVE: See Ophthalmology exam    ASSESSMENT:    ICD-10-CM    1. Subconjunctival hemorrhage, non-traumatic, right H11.31         PLAN:  Reassurance     Lizbet Coffey OD

## 2018-03-01 NOTE — LETTER
3/1/2018         RE: Rochelle Gill  [address] [place] [address]        Dear Colleague,    Thank you for referring your patient, Rochelle Gill, to the [facility]. Please see a copy of my visit note below.    Chief Complaint   Patient presents with     Eye Problem Right Eye     Red eye   Woke up with huge headache, treated with ibuprofen, caffeine and acetaminophen   Headache resolved  bp has always been low  No straining, vomiting or lifting  Dry eye is getting better using artificial tears  And taking supplements  IOP 16 at last visit   Do you wear contact lenses? No    HPI    Symptoms:     Redness      Duration:  1 day                 Blood Pressure from Last 3 Encounters:   07/21/17 128/70   06/23/17 102/72   12/04/16 102/60         Lizbet Coffey, OD     See Review Of Systems         Medical, surgical and family histories reviewed and updated 3/1/2018.         OBJECTIVE: See Ophthalmology exam    ASSESSMENT:    ICD-10-CM    1. Subconjunctival hemorrhage, non-traumatic, right H11.31         PLAN:  Reassurance     Lizbet Coffey OD     Again, thank you for allowing me to participate in the care of your patient.        Sincerely,        Lizbet Coffey OD

## 2018-04-24 ENCOUNTER — RADIANT APPOINTMENT (OUTPATIENT)
Dept: MAMMOGRAPHY | Facility: CLINIC | Age: 53
End: 2018-04-24
Payer: COMMERCIAL

## 2018-04-24 DIAGNOSIS — Z12.31 VISIT FOR SCREENING MAMMOGRAM: ICD-10-CM

## 2018-04-24 PROCEDURE — 77067 SCR MAMMO BI INCL CAD: CPT | Mod: TC

## 2018-04-26 DIAGNOSIS — Z00.00 ROUTINE GENERAL MEDICAL EXAMINATION AT A HEALTH CARE FACILITY: ICD-10-CM

## 2018-04-26 LAB
ALBUMIN SERPL-MCNC: 4.1 G/DL (ref 3.4–5)
ALP SERPL-CCNC: 81 U/L (ref 40–150)
ALT SERPL W P-5'-P-CCNC: 30 U/L (ref 0–50)
ANION GAP SERPL CALCULATED.3IONS-SCNC: 7 MMOL/L (ref 3–14)
AST SERPL W P-5'-P-CCNC: 23 U/L (ref 0–45)
BILIRUB SERPL-MCNC: 0.7 MG/DL (ref 0.2–1.3)
BUN SERPL-MCNC: 7 MG/DL (ref 7–30)
CALCIUM SERPL-MCNC: 9.1 MG/DL (ref 8.5–10.1)
CHLORIDE SERPL-SCNC: 107 MMOL/L (ref 94–109)
CHOLEST SERPL-MCNC: 180 MG/DL
CO2 SERPL-SCNC: 27 MMOL/L (ref 20–32)
CREAT SERPL-MCNC: 0.7 MG/DL (ref 0.52–1.04)
GFR SERPL CREATININE-BSD FRML MDRD: 87 ML/MIN/1.7M2
GLUCOSE SERPL-MCNC: 89 MG/DL (ref 70–99)
HDLC SERPL-MCNC: 60 MG/DL
LDLC SERPL CALC-MCNC: 103 MG/DL
NONHDLC SERPL-MCNC: 120 MG/DL
POTASSIUM SERPL-SCNC: 3.7 MMOL/L (ref 3.4–5.3)
PROT SERPL-MCNC: 7.6 G/DL (ref 6.8–8.8)
SODIUM SERPL-SCNC: 141 MMOL/L (ref 133–144)
TRIGL SERPL-MCNC: 84 MG/DL
TSH SERPL DL<=0.005 MIU/L-ACNC: 2.02 MU/L (ref 0.4–4)

## 2018-04-26 PROCEDURE — 84443 ASSAY THYROID STIM HORMONE: CPT | Performed by: INTERNAL MEDICINE

## 2018-04-26 PROCEDURE — 36415 COLL VENOUS BLD VENIPUNCTURE: CPT | Performed by: INTERNAL MEDICINE

## 2018-04-26 PROCEDURE — 80061 LIPID PANEL: CPT | Performed by: INTERNAL MEDICINE

## 2018-04-26 PROCEDURE — 80053 COMPREHEN METABOLIC PANEL: CPT | Performed by: INTERNAL MEDICINE

## 2018-05-03 ENCOUNTER — OFFICE VISIT (OUTPATIENT)
Dept: FAMILY MEDICINE | Facility: CLINIC | Age: 53
End: 2018-05-03
Payer: COMMERCIAL

## 2018-05-03 VITALS
TEMPERATURE: 98.1 F | OXYGEN SATURATION: 95 % | BODY MASS INDEX: 28.43 KG/M2 | SYSTOLIC BLOOD PRESSURE: 112 MMHG | WEIGHT: 187.6 LBS | DIASTOLIC BLOOD PRESSURE: 76 MMHG | HEIGHT: 68 IN | HEART RATE: 64 BPM | RESPIRATION RATE: 15 BRPM

## 2018-05-03 DIAGNOSIS — Z11.51 SCREENING FOR HUMAN PAPILLOMAVIRUS: ICD-10-CM

## 2018-05-03 DIAGNOSIS — Z00.00 ROUTINE HISTORY AND PHYSICAL EXAMINATION OF ADULT: Primary | ICD-10-CM

## 2018-05-03 DIAGNOSIS — L40.9 PSORIASIS: ICD-10-CM

## 2018-05-03 DIAGNOSIS — Z12.11 SPECIAL SCREENING FOR MALIGNANT NEOPLASMS, COLON: ICD-10-CM

## 2018-05-03 PROCEDURE — G0145 SCR C/V CYTO,THINLAYER,RESCR: HCPCS | Performed by: INTERNAL MEDICINE

## 2018-05-03 PROCEDURE — 99396 PREV VISIT EST AGE 40-64: CPT | Performed by: INTERNAL MEDICINE

## 2018-05-03 PROCEDURE — 87624 HPV HI-RISK TYP POOLED RSLT: CPT | Performed by: INTERNAL MEDICINE

## 2018-05-03 ASSESSMENT — ENCOUNTER SYMPTOMS
CHILLS: 0
DIARRHEA: 0
FREQUENCY: 0
ABDOMINAL PAIN: 0
CONSTIPATION: 0
COUGH: 0
EYE PAIN: 0
FEVER: 0
HEMATURIA: 0
NERVOUS/ANXIOUS: 0
HEMATOCHEZIA: 0
DIZZINESS: 0

## 2018-05-03 NOTE — MR AVS SNAPSHOT
After Visit Summary   5/3/2018    Rochelle Gill    MRN: [id number]           Patient Information     Date Of Birth          1965        Visit Information        Provider Department      5/3/2018 10:00 AM Lexie Steinberg MD [facility]        Today's Diagnoses     Routine history and physical examination of adult    -  1    Screening for human papillomavirus        Special screening for malignant neoplasms, colon          Care Instructions      Preventive Health Recommendations  Female Ages 50 - 64    Yearly exam: See your health care provider every year in order to  o Review health changes.   o Discuss preventive care.    o Review your medicines if your doctor has prescribed any.      Get a Pap test every three years (unless you have an abnormal result and your provider advises testing more often).    If you get Pap tests with HPV test, you only need to test every 5 years, unless you have an abnormal result.     You do not need a Pap test if your uterus was removed (hysterectomy) and you have not had cancer.    You should be tested each year for STDs (sexually transmitted diseases) if you're at risk.     Have a mammogram every 1 to 2 years.    Have a colonoscopy at age 50, or have a yearly FIT test (stool test). These exams screen for colon cancer.      Have a cholesterol test every 5 years, or more often if advised.    Have a diabetes test (fasting glucose) every three years. If you are at risk for diabetes, you should have this test more often.     If you are at risk for osteoporosis (brittle bone disease), think about having a bone density scan (DEXA).    Shots: Get a flu shot each year. Get a tetanus shot every 10 years.    Nutrition:     Eat at least 5 servings of fruits and vegetables each day.    Eat whole-grain bread, whole-wheat pasta and brown rice instead of white grains and rice.    Talk to your provider about Calcium and Vitamin D.     Lifestyle    Exercise at least  150 minutes a week (30 minutes a day, 5 days a week). This will help you control your weight and prevent disease.    Limit alcohol to one drink per day.    No smoking.     Wear sunscreen to prevent skin cancer.     See your dentist every six months for an exam and cleaning.    See your eye doctor every 1 to 2 years.            Follow-ups after your visit        Additional Services     GASTROENTEROLOGY ADULT REF PROCEDURE ONLY Darci Madrigal [phone]; No Provider Preference       Last Lab Result: Creatinine (mg/dL)       Date                     Value                 04/26/2018               0.70             ----------  There is no height or weight on file to calculate BMI.      Patient will be contacted to schedule procedure.     Please be aware that coverage of these services is subject to the terms and limitations of your health insurance plan.  Call member services at your health plan with any benefit or coverage questions.  Any procedures must be performed at a [facility] facility OR coordinated by your clinic's referral office.    Please bring the following with you to your appointment:    (1) Any X-Rays, CTs or MRIs which have been performed.  Contact the facility where they were done to arrange for  prior to your scheduled appointment.    (2) List of current medications   (3) This referral request   (4) Any documents/labs given to you for this referral                  Who to contact     If you have questions or need follow up information about today's clinic visit or your schedule please contact [facility] directly at [phone].  Normal or non-critical lab and imaging results will be communicated to you by MyChart, letter or phone within 4 business days after the clinic has received the results. If you do not hear from us within 7 days, please contact the clinic through MyChart or phone. If you have a critical or abnormal lab result, we will notify you by phone as soon as  "possible.  Submit refill requests through [employer] or call your pharmacy and they will forward the refill request to us. Please allow 3 business days for your refill to be completed.          Additional Information About Your Visit        [employer] Information     [employer] gives you secure access to your electronic health record. If you see a primary care provider, you can also send messages to your care team and make appointments. If you have questions, please call your primary care clinic.  If you do not have a primary care provider, please call [phone] and they will assist you.        Care EveryWhere ID     This is your Care EveryWhere ID. This could be used by other organizations to access your [facility] medical records  [id number]        Your Vitals Were     Pulse Temperature Respirations Height Pulse Oximetry BMI (Body Mass Index)    64 98.1  F (36.7  C) (Oral) 15 5' 8\" (1.727 m) 95% 28.52 kg/m2       Blood Pressure from Last 3 Encounters:   05/03/18 112/76   07/21/17 128/70   06/23/17 102/72    Weight from Last 3 Encounters:   05/03/18 187 lb 9.6 oz (85.1 kg)   07/21/17 181 lb (82.1 kg)   06/23/17 180 lb 14.4 oz (82.1 kg)              We Performed the Following     GASTROENTEROLOGY ADULT REF PROCEDURE ONLY Darci Madrigal [phone]; No Provider Preference     HPV High Risk Types DNA Cervical     Pap imaged thin layer screen with HPV - recommended age 30 - 65 years (select HPV order below)        Primary Care Provider Office Phone # Fax #    Lexie Steinberg [phone] [id number] [facility] [address]        Equal Access to Services     NELSON PENDLETON : Hadii viridiana James, luke mary, sanket morel. So [facility] [phone].    ATENCIÓN: Si habla español, tiene a schwartz disposición servicios gratuitos de asistencia lingüística. Llame al [phone].    We comply with applicable federal civil rights laws and " [place] laws. We do not discriminate on the basis of race, color, national origin, age, disability, sex, sexual orientation, or gender identity.            Thank you!     Thank you for choosing [facility]  for your care. Our goal is always to provide you with excellent care. Hearing back from our patients is one way we can continue to improve our services. Please take a few minutes to complete the written survey that you may receive in the mail after your visit with us. Thank you!             Your Updated Medication List - Protect others around you: Learn how to safely use, store and throw away your medicines at www.disposemymeds.org.          This list is accurate as of 5/3/18 10:45 AM.  Always use your most recent med list.                   Brand Name Dispense Instructions for use Diagnosis    augmented betamethasone dipropionate 0.05 % ointment    DIPROLENE-AF    50 g    Apply twice per day as needed to rash on the legs    Rash, Lichen simplex chronicus       terbinafine 1 % cream    lamISIL AT    12 g    Apply twice daily to rash on bottom of feet for 2 weeks    Tinea pedis of both feet

## 2018-05-03 NOTE — PROGRESS NOTES
SUBJECTIVE:   CC: Rochelle Gill is an 53 year old woman who presents for preventive health visit.     Physical   Annual:     Getting at least 3 servings of Calcium per day::  Yes    Bi-annual eye exam::  Yes    Dental care twice a year::  Yes    Sleep apnea or symptoms of sleep apnea::  None    Diet::  Vegetarian/vegan and Gluten-free/reduced    Frequency of exercise::  2-3 days/week    Duration of exercise::  15-30 minutes    Taking medications regularly::  Yes    Medication side effects::  Not applicable    Additional concerns today::  No          Over the winter Rochelle went to yoga but feels that she was not as active as she would have liked to be. Now that it is warmer out she will begin walking the dog, and exercise outside. .          Today's PHQ-2 Score:   PHQ-2 ( 1999 [employer]) 5/3/2018   Q1: Little interest or pleasure in doing things 0   Q2: Feeling down, depressed or hopeless 0   PHQ-2 Score 0   Q1: Little interest or pleasure in doing things Not at all   Q2: Feeling down, depressed or hopeless Not at all   PHQ-2 Score 0       Abuse: Current or Past(Physical, Sexual or Emotional)- No  Do you feel safe in your environment - Yes    Social History   Substance Use Topics     Smoking status: Former Smoker     Types: Cigarettes     Start date: 9/1/1982     Quit date: 6/1/1989     Smokeless tobacco: Never Used     Alcohol use Yes      Comment: 3-5 glasses of wine per week       Reviewed orders with patient.  Reviewed health maintenance and updated orders accordingly - Yes      Patient over age 50, mutual decision to screen reflected in health maintenance.    Pertinent mammograms are reviewed under the imaging tab.  History of abnormal Pap smear: NO - age 30- 65 PAP every 3 years recommended  Had a mammogram last week, normal findings.     Reviewed and updated as needed this visit by clinical staff  Tobacco  Allergies  Meds  Med Hx  Surg Hx  Fam Hx  Soc Hx        Reviewed and updated as needed this visit by  "Provider        Past Medical History:   Diagnosis Date     Arthritis      NO ACTIVE PROBLEMS       Past Surgical History:   Procedure Laterality Date     NO HISTORY OF SURGERY         Review of Systems   Constitutional: Negative for chills and fever.   HENT: Negative for congestion and ear pain.    Eyes: Negative for pain.   Respiratory: Negative for cough.    Cardiovascular: Negative for chest pain.   Gastrointestinal: Negative for abdominal pain, constipation, diarrhea and hematochezia.   Genitourinary: Negative for frequency, genital sores and hematuria.   Neurological: Negative for dizziness.   Psychiatric/Behavioral: The patient is not nervous/anxious.       OBJECTIVE:   /76  Pulse 64  Temp 98.1  F (36.7  C) (Oral)  Resp 15  Ht 5' 8\" (1.727 m)  Wt 187 lb 9.6 oz (85.1 kg)  SpO2 95%  BMI 28.52 kg/m2  Physical Exam  GENERAL: healthy, alert and no distress  EYES: Eyes grossly normal to inspection, PERRL and conjunctivae and sclerae normal  HENT: ear canals and TM's normal, nose and mouth without ulcers or lesions  NECK: no adenopathy, no asymmetry, masses, or scars and thyroid normal to palpation  RESP: lungs clear to auscultation - no rales, rhonchi or wheezes  BREAST: normal without masses, tenderness or nipple discharge and no palpable axillary masses or adenopathy  CV: regular rate and rhythm, normal S1 S2,  no murmur, no peripheral edema and peripheral pulses strong  ABDOMEN: soft, nontender, no hepatosplenomegaly, no masses and bowel sounds normal  MS: no gross musculoskeletal defects noted, no edema  SKIN: Psoriasis noted bilateral elbows, left knee, right dorsum of foot; no thickened plaque, no suspicious lesions  NEURO: Normal strength and tone, mentation intact and speech normal  PSYCH: mentation appears normal, affect normal/bright  Pelvic Exam: Vulva: No external lesions, normal hair distribution, no adenopathy; Vagina: Moist, pink, no abnormal discharge, well rugated, no lesions; Cervix: " "Pap smear is taken, parous, smooth, pink, no visible lesions; Uterus: Normal size, anteverted, non-tender, mobile; Ovaries: No mass, non-tender, mobile; Rectal exam: No mass, non-tender, normal sphincter tone, hemoccult negative      ASSESSMENT/PLAN:   (Z00.00) Routine history and physical examination of adult  (primary encounter diagnosis)  Comment: HEALTH CARE MAINTENANCE and immunizations monitored and up to date.   Plan: Update Tdap immunization into the Hx    (Z11.51) Screening for human papillomavirus  Comment: Ordered  Plan: Pap imaged thin layer screen with HPV -         recommended age 30 - 65 years (select HPV order        below), HPV High Risk Types DNA Cervical            (Z12.11) Special screening for malignant neoplasms, colon  Comment: Patient knows that she is due for a colonoscopy and is interested in scheduling an appt.  Plan: GASTROENTEROLOGY ADULT REF PROCEDURE ONLY         Darci Madrigal [phone]; No Provider         Preference            (L40.9) Psoriasis  Comment: Patient is experiencing psoriasis on bilateral elbows, left knee, right dorsum of foot; no thickened plaque She is not currently on medication but states that the sun helps clear things up. She has seen Rheum and derm; no added meds to date;   Plan: Continue to monitor     COUNSELING:  Reviewed preventive health counseling, as reflected in patient instructions       Regular exercise       Healthy diet/nutrition       Colon cancer screening       reports that she quit smoking about 28 years ago. Her smoking use included Cigarettes. She started smoking about 35 years ago. She has never used smokeless tobacco.    Estimated body mass index is 28.52 kg/(m^2) as calculated from the following:    Height as of this encounter: 5' 8\" (1.727 m).    Weight as of this encounter: 187 lb 9.6 oz (85.1 kg).   Weight management plan: Discussed healthy diet and exercise guidelines and patient will follow up in 12 months in clinic to " re-evaluate.    Counseling Resources:  ATP IV Guidelines  Pooled Cohorts Equation Calculator  Breast Cancer Risk Calculator  FRAX Risk Assessment  ICSI Preventive Guidelines  Dietary Guidelines for Americans, 2010  USDA's MyPlate  ASA Prophylaxis  Lung CA Screening    Lexie Steinberg MD  Internal Medicine  [facility]    The information in this document, created by a medical scribe for me, accurately reflects the services I personally performed and the decisions made by me. I have reviewed and approved this document for accuracy.  Dr. Lexie Steinberg, 10:50 AM, May 3, 2018    Answers for HPI/ROS submitted by the patient on 5/3/2018   PHQ-2 Score: 0

## 2018-05-07 LAB
COPATH REPORT: NORMAL
PAP: NORMAL

## 2018-05-09 LAB
FINAL DIAGNOSIS: NORMAL
HPV HR 12 DNA CVX QL NAA+PROBE: NEGATIVE
HPV16 DNA SPEC QL NAA+PROBE: NEGATIVE
HPV18 DNA SPEC QL NAA+PROBE: NEGATIVE
SPECIMEN DESCRIPTION: NORMAL
SPECIMEN SOURCE CVX/VAG CYTO: NORMAL

## 2019-01-15 DIAGNOSIS — L28.0 LICHEN SIMPLEX CHRONICUS: ICD-10-CM

## 2019-01-15 DIAGNOSIS — R21 RASH: ICD-10-CM

## 2019-01-16 RX ORDER — BETAMETHASONE DIPROPIONATE 0.5 MG/G
OINTMENT, AUGMENTED TOPICAL
Qty: 50 G | Refills: 3 | OUTPATIENT
Start: 2019-01-16

## 2019-01-16 NOTE — TELEPHONE ENCOUNTER
augmented betamethasone dipropionate (DIPROLENE-AF) 0.05 % ointment  Last Written Prescription Date:  9/21/17  Last Fill Quantity: 50g,   # refills: 3  Last Office Visit : 9/21/17  Future Office visit:  None    Process 1  Refused. lv > 12 mths.    Scheduling has been notified to contact the pt for appointment.

## 2019-01-17 ENCOUNTER — TELEPHONE (OUTPATIENT)
Dept: DERMATOLOGY | Facility: CLINIC | Age: 54
End: 2019-01-17

## 2019-01-17 NOTE — TELEPHONE ENCOUNTER
----- Message from Jessica Coffey RN sent at 1/16/2019 12:03 PM CST -----  DONALDO Montes [[id number]]   Radha Dimas    Please call to schedule.    Thanks    I do not need any follow up on the scheduling of this appointment unless the patient will no longer be receiving care in our clinic.

## 2019-01-24 DIAGNOSIS — L28.0 LICHEN SIMPLEX CHRONICUS: ICD-10-CM

## 2019-01-24 DIAGNOSIS — R21 RASH: ICD-10-CM

## 2019-01-24 RX ORDER — BETAMETHASONE DIPROPIONATE 0.5 MG/G
OINTMENT, AUGMENTED TOPICAL
Qty: 50 G | Refills: 3 | Status: CANCELLED | OUTPATIENT
Start: 2019-01-24

## 2019-01-25 NOTE — TELEPHONE ENCOUNTER
Never prescribed by Dr. Steinberg. Originally prescribed by Rheumatology and Dermatology.   Looks like refill also attempted through Derm on 1/15/19 - appointment recommended.     Called patient to advise. Patient stated understanding that she needs to follow up with Dermatology but states it is two months until she can get an appointment with them. This writer also advised this medication was originally prescribed by Rheumatology.    The patient is asking since Dr. Steinberg saw her legs in May if she would be willing to write a short prescription for either this medication or the one she was prescribed at [facility] (could not remember the name, not seeing outside reports with info). Patient is aware Dr. Steinberg is not in clinic until next week.    Routing to PCP.

## 2019-01-25 NOTE — TELEPHONE ENCOUNTER
"Requested Prescriptions   Pending Prescriptions Disp Refills     augmented betamethasone dipropionate (DIPROLENE-AF) 0.05 % external ointment    Last Written Prescription Date:  9/21/2017  Last Fill Quantity: 50 g,  # refills: 3   Last office visit: 5/3/2018 with prescribing provider:  Lexie Steinberg     Future Office Visit:     50 g 3     Sig: Apply twice per day as needed to rash on the legs    Topical Steroids and Nonsteroidals Protocol Passed - 1/24/2019  4:37 PM       Passed - Patient is age 6 or older       Passed - Authorizing prescriber's most recent note related to this medication read.    If refill request is for ophthalmic use, please forward request to provider for approval.         Passed - High potency steroid not ordered       Passed - Recent (12 mo) or future (30 days) visit within the authorizing provider's specialty    Patient had office visit in the last 12 months or has a visit in the next 30 days with authorizing provider or within the authorizing provider's specialty.  See \"Patient Info\" tab in inbasket, or \"Choose Columns\" in Meds & Orders section of the refill encounter.             Passed - Medication is active on med list            "

## 2019-01-29 NOTE — TELEPHONE ENCOUNTER
Unable to fill without evaluation;    Recommend derm ideally. Request derm to fill interim until she can be seen.

## 2019-01-29 NOTE — TELEPHONE ENCOUNTER
"Called patient to discuss. She did not want to wait to be seen by derm. She stated that she can not get in for a \"few\" months and she wants this taken care of soon.     She will be in [place] tomorrow for another appointment. She stated she lives far from [place] and it is not always easy to get over to the clinic.     Transferred her to scheduling to see if something is available for her in [place] tomorrow.     Yulisa Duvall RN Flex    "

## 2019-01-30 ENCOUNTER — OFFICE VISIT (OUTPATIENT)
Dept: OPTOMETRY | Facility: CLINIC | Age: 54
End: 2019-01-30
Payer: COMMERCIAL

## 2019-01-30 ENCOUNTER — OFFICE VISIT (OUTPATIENT)
Dept: PEDIATRICS | Facility: CLINIC | Age: 54
End: 2019-01-30
Payer: COMMERCIAL

## 2019-01-30 ENCOUNTER — TELEPHONE (OUTPATIENT)
Dept: FAMILY MEDICINE | Facility: CLINIC | Age: 54
End: 2019-01-30

## 2019-01-30 VITALS
DIASTOLIC BLOOD PRESSURE: 68 MMHG | SYSTOLIC BLOOD PRESSURE: 106 MMHG | HEIGHT: 67 IN | TEMPERATURE: 98.2 F | HEART RATE: 78 BPM | RESPIRATION RATE: 16 BRPM | BODY MASS INDEX: 29.38 KG/M2 | OXYGEN SATURATION: 96 %

## 2019-01-30 DIAGNOSIS — Z12.11 SCREEN FOR COLON CANCER: ICD-10-CM

## 2019-01-30 DIAGNOSIS — H52.4 PRESBYOPIA: ICD-10-CM

## 2019-01-30 DIAGNOSIS — L28.0 LICHEN SIMPLEX CHRONICUS: Primary | ICD-10-CM

## 2019-01-30 DIAGNOSIS — H04.129 DRY EYE: ICD-10-CM

## 2019-01-30 DIAGNOSIS — R21 RASH: ICD-10-CM

## 2019-01-30 DIAGNOSIS — H16.223 KERATOCONJUNCTIVITIS SICCA DUE TO DECREASED TEAR PRODUCTION, BILATERAL: ICD-10-CM

## 2019-01-30 DIAGNOSIS — H52.03 HYPERMETROPIA OF BOTH EYES: Primary | ICD-10-CM

## 2019-01-30 PROCEDURE — 92014 COMPRE OPH EXAM EST PT 1/>: CPT | Performed by: OPTOMETRIST

## 2019-01-30 PROCEDURE — 99213 OFFICE O/P EST LOW 20 MIN: CPT | Mod: GE | Performed by: STUDENT IN AN ORGANIZED HEALTH CARE EDUCATION/TRAINING PROGRAM

## 2019-01-30 RX ORDER — NEOMYCIN POLYMYXIN B SULFATES AND DEXAMETHASONE 3.5; 10000; 1 MG/ML; [USP'U]/ML; MG/ML
SUSPENSION/ DROPS OPHTHALMIC
Qty: 5 ML | Refills: 0 | Status: SHIPPED | OUTPATIENT
Start: 2019-01-30 | End: 2020-03-20

## 2019-01-30 RX ORDER — CYCLOSPORINE 0.5 MG/ML
1 EMULSION OPHTHALMIC 2 TIMES DAILY
Qty: 1 BOX | Refills: 11 | Status: SHIPPED | OUTPATIENT
Start: 2019-01-30 | End: 2020-03-20

## 2019-01-30 RX ORDER — MOXIFLOXACIN 5 MG/ML
SOLUTION/ DROPS OPHTHALMIC
Qty: 1 BOTTLE | Refills: 0 | Status: SHIPPED | OUTPATIENT
Start: 2019-01-30 | End: 2019-01-30 | Stop reason: ALTCHOICE

## 2019-01-30 RX ORDER — BETAMETHASONE DIPROPIONATE 0.5 MG/G
OINTMENT, AUGMENTED TOPICAL
Qty: 50 G | Refills: 3 | Status: SHIPPED | OUTPATIENT
Start: 2019-01-30 | End: 2019-11-14

## 2019-01-30 ASSESSMENT — VISUAL ACUITY
OD_SC: 20/40
OS_SC: 20/50
METHOD: SNELLEN - LINEAR
OS_SC+: +2

## 2019-01-30 ASSESSMENT — REFRACTION_MANIFEST
OS_CYLINDER: SPHERE
OD_SPHERE: +1.00
OS_SPHERE: +1.50
OD_CYLINDER: SPHERE

## 2019-01-30 ASSESSMENT — TONOMETRY
IOP_METHOD: APPLANATION
OS_IOP_MMHG: 16
OD_IOP_MMHG: 16

## 2019-01-30 ASSESSMENT — EXTERNAL EXAM - RIGHT EYE: OD_EXAM: NORMAL

## 2019-01-30 ASSESSMENT — CUP TO DISC RATIO
OD_RATIO: 0.55
OS_RATIO: 0.5

## 2019-01-30 ASSESSMENT — EXTERNAL EXAM - LEFT EYE: OS_EXAM: NORMAL

## 2019-01-30 NOTE — PROGRESS NOTES
Chief Complaint   Patient presents with     Annual Eye Exam      Eyes are super dry and irritated, vision is blurry , just using readers  Warm compresses/ lid cleansing artificial tears  Two times daily-three times daily   Last Eye Exam: 2018  Dilated Previously: Yes    What are you currently using to see?  readers       Distance Vision Acuity: Satisfied with vision    Near Vision Acuity: Satisfied with vision while reading and using computer with readers    Eye Comfort: good and dry  Do you use eye drops? : Yes: 2-3 x daily    Taking benadryl and antihistamines   Using a space heater       Final Rx      Sphere Cylinder Axis Add   Right +1.00 +0.25 170 +2.25   Left +1.25 +0.25 045 +2.25   Type: PAL   Expiration Date: 1/12/2020         Medical, surgical and family histories reviewed and updated 1/30/2019.       OBJECTIVE: See Ophthalmology exam    ASSESSMENT:    ICD-10-CM    1. Hypermetropia of both eyes H52.03    2. Presbyopia H52.4    3. Dry eye H04.129    4. Keratoconjunctivitis sicca due to decreased tear production, bilateral H16.223       PLAN:   Increase artificial tears NP add maxitrol for one week and start restasis       Lizbet Coffey OD

## 2019-01-30 NOTE — PATIENT INSTRUCTIONS
Thanks for coming in today! Here is what we discussed:     > Steroid refill sent to our pharmacy    > colonoscopy referral sent, you will have to call them.

## 2019-01-30 NOTE — PROGRESS NOTES
SUBJECTIVE:   Rochelle Gill is a 53 year old female who presents to clinic today for the following health issues:    Requested to get this filled with primary-Was told to schedule with derm for refill. Specialty is booked.     Medication Followup of augmented betamethasone dipropionate (Dipropionate)    Taking Medication as prescribed: yes    Side Effects:  None    Medication Helping Symptoms:  yes     Has what she thinks is psoriasis but Dr. Dimas of dermatology thinks is lichen simplex chronicus. Regardless, responds to steroids and sunlight. She has a flare right now that is minor, only on legs, and otherwise she would not want tx. HOwever, she is going to [place] next week and wants to have bare legs on the beach.     She knows that steroids are to be treated judiciously and is frustrated by the process of getting refills.      -------------------------------------    Problem list and histories reviewed & adjusted, as indicated.  Additional history: as documented    Patient Active Problem List   Diagnosis     Family history of thyroid disease     Psoriasis     Right foot pain     Psoriatic arthritis (H)     Dry eye     Scar of cornea of left eye     Past Surgical History:   Procedure Laterality Date     NO HISTORY OF SURGERY         Social History     Tobacco Use     Smoking status: Former Smoker     Types: Cigarettes     Start date: 1982     Last attempt to quit: 1989     Years since quittin.6     Smokeless tobacco: Never Used   Substance Use Topics     Alcohol use: Yes     Comment: 3-5 glasses of wine per week     Family History   Problem Relation Age of Onset     Lung Cancer Mother         age 62;  2006     Other - See Comments Mother         Chrohns disease     Glaucoma Mother      Thyroid Disease Sister      Macular Degeneration No family hx of            Reviewed and updated as needed this visit by clinical staff       Reviewed and updated as needed this visit by Provider      "    ROS:  Constitutional, HEENT, cardiovascular, pulmonary, gi and gu systems are negative, except as otherwise noted.    OBJECTIVE:     /68 (BP Location: Right arm, Patient Position: Chair, Cuff Size: Adult Regular)   Pulse 78   Temp 98.2  F (36.8  C) (Oral)   Resp 16   Ht 1.702 m (5' 7\")   SpO2 96%   BMI 29.38 kg/m    Body mass index is 29.38 kg/m .  GENERAL: healthy, alert and no distress  RESP: lungs clear to auscultation - no rales, rhonchi or wheezes  CV: regular rate and rhythm, normal S1 S2, no S3 or S4, no murmur, click or rub, no peripheral edema and peripheral pulses strong  MS: no gross musculoskeletal defects noted, no edema  SKIN: On bilateral shins (not including joints) has ~5cm purple plaques that are geographic and well demarcated.     Diagnostic Test Results:  none     ASSESSMENT/PLAN:     1. Lichen simplex chronicus  Refilled per patient request.   - augmented betamethasone dipropionate (DIPROLENE-AF) 0.05 % external ointment; Apply twice per day as needed to rash on the legs  Dispense: 50 g; Refill: 3    2. Rash  - augmented betamethasone dipropionate (DIPROLENE-AF) 0.05 % external ointment; Apply twice per day as needed to rash on the legs  Dispense: 50 g; Refill: 3    3. Screen for colon cancer  - GASTROENTEROLOGY ADULT REF PROCEDURE ONLY Darci  [phone]; No Provider Preference    Patient Instructions   Thanks for coming in today! Here is what we discussed:     > Steroid refill sent to our pharmacy    > colonoscopy referral sent, you will have to call them.       Lili Solitario MD  [facility]    I have discussed the patient with the resident and agree with the jointly developed plan as documented above    Lien Carlson MD  Internal Medicine - Pediatrics      "

## 2019-01-30 NOTE — LETTER
1/30/2019         RE: Rochelle Gill  [address] [place] [address]        Dear Colleague,    Thank you for referring your patient, Rochelle Gill, to the [facility]. Please see a copy of my visit note below.    Chief Complaint   Patient presents with     Annual Eye Exam      Eyes are super dry and irritated, vision is blurry , just using readers  Warm compresses/ lid cleansing artificial tears  Two times daily-three times daily   Last Eye Exam: 2018  Dilated Previously: Yes    What are you currently using to see?  readers       Distance Vision Acuity: Satisfied with vision    Near Vision Acuity: Satisfied with vision while reading and using computer with readers    Eye Comfort: good and dry  Do you use eye drops? : Yes: 2-3 x daily    Taking benadryl and antihistamines   Using a space heater       Final Rx      Sphere Cylinder Axis Add   Right +1.00 +0.25 170 +2.25   Left +1.25 +0.25 045 +2.25   Type: PAL   Expiration Date: 1/12/2020         Medical, surgical and family histories reviewed and updated 1/30/2019.       OBJECTIVE: See Ophthalmology exam    ASSESSMENT:    ICD-10-CM    1. Hypermetropia of both eyes H52.03    2. Presbyopia H52.4    3. Dry eye H04.129    4. Keratoconjunctivitis sicca due to decreased tear production, bilateral H16.223       PLAN:   Increase artificial tears NP add maxitrol for one week and start restasis       Lizbet Coffey OD     Again, thank you for allowing me to participate in the care of your patient.        Sincerely,        Lizbet Coffey, OD

## 2019-01-30 NOTE — TELEPHONE ENCOUNTER
Prior Authorization Retail Medication Request    Medication/Dose: Restasis  ICD code (if different than what is on RX):    Dry eye [H04.129]       Keratoconjunctivitis sicca due to decreased tear production, bilateral [H16.223]     Previously Tried and Failed:  unknown  Rationale:  Prior auth required    Insurance Name:  [employer]  Insurance ID:  [id number]    Thank you,   Cally Alexander  [facility]

## 2019-01-30 NOTE — PATIENT INSTRUCTIONS
Start with maxitrol three times daily and artificial tears  Non preserved 4x daily then reduce the maxitrol to two times daily the second week , staying on artificial tears   Continue with warm compresses/ lid hygiene  Will get a prior auth for restasis and you will start that two times daily along with artificial tears for months or long term

## 2019-02-05 NOTE — TELEPHONE ENCOUNTER
Central Prior Authorization Team   Phone: [phone]      PA Initiation    Medication: Restasis  Insurance Company: [employer] - Phone [phone] Fax [phone]  Pharmacy Filling the Rx: [facility] - [address] [facility]   Filling Pharmacy Phone: [phone]  Filling Pharmacy Fax:    Start Date: 2/5/2019

## 2019-02-08 NOTE — TELEPHONE ENCOUNTER
Prior Authorization Approval    Authorization Effective Date: 2/8/2019  Authorization Expiration Date: 2/8/2020  Medication: Restasis  Approved Dose/Quantity:    Reference #: [id number]   Insurance Company: [email or web address] - Phone [phone] Fax [phone]  Expected CoPay:       CoPay Card Available:      Foundation Assistance Needed:    Which Pharmacy is filling the prescription (Not needed for infusion/clinic administered): [facility] [place], [place] - [address] [facility]   Pharmacy Notified: Yes  Patient Notified: Yes

## 2019-04-26 ENCOUNTER — ANCILLARY PROCEDURE (OUTPATIENT)
Dept: MAMMOGRAPHY | Facility: CLINIC | Age: 54
End: 2019-04-26
Attending: INTERNAL MEDICINE
Payer: COMMERCIAL

## 2019-04-26 DIAGNOSIS — Z12.31 VISIT FOR SCREENING MAMMOGRAM: ICD-10-CM

## 2019-04-26 PROCEDURE — 77067 SCR MAMMO BI INCL CAD: CPT | Mod: TC | Performed by: INTERNAL MEDICINE

## 2019-10-09 ENCOUNTER — DOCUMENTATION ONLY (OUTPATIENT)
Dept: CARE COORDINATION | Facility: CLINIC | Age: 54
End: 2019-10-09

## 2019-11-09 ENCOUNTER — HEALTH MAINTENANCE LETTER (OUTPATIENT)
Age: 54
End: 2019-11-09

## 2019-11-14 ENCOUNTER — OFFICE VISIT (OUTPATIENT)
Dept: DERMATOLOGY | Facility: CLINIC | Age: 54
End: 2019-11-14
Payer: COMMERCIAL

## 2019-11-14 DIAGNOSIS — L30.9 DERMATITIS: Primary | ICD-10-CM

## 2019-11-14 DIAGNOSIS — R21 RASH: ICD-10-CM

## 2019-11-14 DIAGNOSIS — L28.0 LICHEN SIMPLEX CHRONICUS: ICD-10-CM

## 2019-11-14 RX ORDER — BETAMETHASONE DIPROPIONATE 0.5 MG/G
OINTMENT, AUGMENTED TOPICAL
Qty: 50 G | Refills: 3 | Status: SHIPPED | OUTPATIENT
Start: 2019-11-14 | End: 2020-11-25

## 2019-11-14 ASSESSMENT — PAIN SCALES - GENERAL
PAINLEVEL: NO PAIN (0)
PAINLEVEL: NO PAIN (0)

## 2019-11-14 NOTE — NURSING NOTE
Dermatology Rooming Note    Rochelle Gill's goals for this visit include:   Chief Complaint   Patient presents with     Derm Problem     Rochelle is here for a follow up, states there's no improvement.        Linda Downey LPN

## 2019-11-14 NOTE — PATIENT INSTRUCTIONS
Wound Care After a Biopsy    What is a skin biopsy?  A skin biopsy allows the doctor to examine a very small piece of tissue under the microscope to determine the diagnosis and the best treatment for the skin condition. A local anesthetic (numbing medicine)  is injected with a very small needle into the skin area to be tested. A small piece of skin is taken from the area. Sometimes a suture (stitch) is used.     What are the risks of a skin biopsy?  I will experience scar, bleeding, swelling, pain, crusting and redness. I may experience incomplete removal or recurrence. Risks of this procedure are excessive bleeding, bruising, infection, nerve damage, numbness, thick (hypertrophic or keloidal) scar and non-diagnostic biopsy.    How should I care for my wound for the first 24 hours?    Keep the wound dry and covered for 24 hours    If it bleeds, hold direct pressure on the area for 15 minutes. If bleeding does not stop then go to the emergency room    Avoid strenuous exercise the first 1-2 days or as your doctor instructs you    How should I care for the wound after 24 hours?    After 24 hours, remove the bandage    You may bathe or shower as normal    If you had a scalp biopsy, you can shampoo as usual and can use shower water to clean the biopsy site daily    Clean the wound twice a day with gentle soap and water    Do not scrub, be gentle    Apply white petroleum/Vaseline after cleaning the wound with a cotton swab or a clean finger, and keep the site covered with a Bandaid /bandage. Bandages are not necessary with a scalp biopsy    If you are unable to cover the site with a Bandaid /bandage, re-apply ointment 2-3 times a day to keep the site moist. Moisture will help with healing    Avoid strenuous activity for first 1-2 days    Avoid lakes, rivers, pools, and oceans until the stitches are removed or the site is healed    How do I clean my wound?    Wash hands thoroughly with soap or use hand  before all  wound care    Clean the wound with gentle soap and water    Apply white petroleum/Vaseline  to wound after it is clean    Replace the Bandaid /bandage to keep the wound covered for the first few days or as instructed by your doctor    If you had a scalp biopsy, warm shower water to the area on a daily basis should suffice    What should I use to clean my wound?     Cotton-tipped applicators (Qtips )    White petroleum jelly (Vaseline ). Use a clean new container and use Q-tips to apply.    Bandaids   as needed    Gentle soap     How should I care for my wound long term?    Do not get your wound dirty    Keep up with wound care for one week or until the area is healed.    A small scab will form and fall off by itself when the area is completely healed. The area will be red and will become pink in color as it heals. Sun protection is very important for how your scar will turn out. Sunscreen with an SPF 30 or greater is recommended once the area is healed.    If you have stitches, stitches need to be removed in 14 days. You may return to our clinic for this or you may have it done locally at your doctor s office.    You should have some soreness but it should be mild and slowly go away over several days. Talk to your doctor about using tylenol for pain,    When should I call my doctor?  If you have increased:     Pain or swelling    Pus or drainage (clear or slightly yellow drainage is ok)    Temperature over 100F    Spreading redness or warmth around wound    When will I hear about my results?  The biopsy results can take 2-3 weeks to come back. The clinic will call you with the results, send you a [employer] message, or have you schedule a follow-up clinic or phone time to discuss the results. Contact our clinics if you do not hear from us in 3 weeks.     Who should I call with questions?    [facility]: [phone]     [facility]: [phone]    For  urgent needs outside of business hours call the [facility] at [phone] and ask for the dermatology resident on call      Patient Instructions for Narrow Band Ultraviolet B (NBUVB) Therapy    I will have pinkness or redness and my skin will be tan. I may have persistent redness or itching. Risks of the procedure include burn, pain, scar, skin discoloration, itching, eye damage, worsening of skin condition, skin aging and skin cancer. Multiple treatments may be required.     1. If your skin lesions are very scaly, you will be asked to apply mineral oil to the involved skin before treatment. This increases the penetration of the light.    2. Before your light treatment you will apply sunscreen to your lips to protect them from unnecessary ultraviolet light exposure.    3. You may also be asked to apply sunscreen to your face prior to light therapy if it is not affected by the skin condition. This is to protect your face from unnecessary ultraviolet light exposure.    4. All patients will wear ultraviolet light-blocking eye protection in the sommer. Close your eyes behind the goggles.     5. Female patients will apply sunscreen to the nipples and surrounding area. Male patients will cover the genitals with an opaque sock or supporter.     6. If you have orders to cover additional parts of your body during the treatment, by using an undergarment, shirt, etc., always use this same item every time to prevent burning.      7. You should not sunbathe or go to tanning booths during the time you are receiving light therapy. When you are outdoors you should apply a sunscreen to all exposed areas. If you get sunburned you will not be able to receive light treatments.    8. Light therapy has a drying effect on the skin, so it is important to apply a moisturizing cream or ointment frequently.    9. You may want to try and schedule a light appointment on the same day as your follow-up clinic visits.    10. Report any  "redness or burning that lasts longer than 24 hours, any increase in itching, or changes in skin condition. You should feel like your skin has a \"sun kissed\" feeling.    11. Inform your phototherapy nurse or your Dermatology doctor, before having any more light treatments, if (a) you start taking any new medication or (b) you develop any new health problems.     12. Please do not bring children to your phototherapy appointments. If children must come with you, please bring a responsible adult caregiver to supervise them.    13. IF YOU ARE HAVING TREATMENT AT THE [place], you must check with your insurance company to make sure phototherapy is covered before you can start treatment.    Procedure codes to tell your insurance company are:         [id number] if you are not going petroleum or sunscreen.            Who should I call with questions?    [facility]: [phone]     [facility]: [phone]    For urgent needs outside of business hours call the [facility] at [phone] and ask for the dermatology resident on call          "

## 2019-11-14 NOTE — PROGRESS NOTES
[facility] Dermatology Note    Dermatology Clinic  [facility]  Clinics and Surgery Center  [address] [place] [id number]    Dermatology Problem List:  1. Lichen simplex chronicus and eczematous eruption on legs  2. 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        History of Present Illness:  Ms. Rochelle Gill is a 54 year old female who presents for followup of a rash. The patient was last seen in clinic 9/21/17 for evaluation of a rash. At the time she was to begin betamethasone diproprionate 0.05% ointment BID for 2-4 weeks and return as needed.   Today the patient reports that she hasn't experienced much improvement in the interim. Reports that she has been having, isolated to the lower extremities and now the elbows bilaterally, the same symptoms. This has been going on since about 10 years ago.   Reports that the betamethasone diproprionate has helped. Although it doesn't resolve issues, it does help to clear affected areas for short periods of time. Reports that she wears gloves to bed, doesn't touch or scratch the rash, but reports ongoing sensitivity and tenderness. Denies pruritus.      States that she had a lot of pain and stiffness in the bilateral ankles and saw the rheumatologist; this was about 2 years ago and she had a Bx of the synovial fluid. Reports not significant findings. Notes that this is intermittent.    Showers and uses baking soda or epsom salt in bath. Uses a mineral oil and completely non-scented, and Free and Clear. Notes increased irritability with lack of sleep and stress. Uses no shaving cream. Not on any medications.  Notes she had allergy testing about 15 years ago at [place]-- notably to mold, dust, and pollen. No eczema as a child.    The patient is otherwise feeling well  overall. No other skin concerns at this time.      Past Medical History:   Patient Active Problem List   Diagnosis     Family history of thyroid disease     Psoriasis     Right foot pain     Psoriatic arthritis (H)     Dry eye     Scar of cornea of left eye     Past Medical History:   Diagnosis Date     Arthritis      NO ACTIVE PROBLEMS      Past Surgical History:   Procedure Laterality Date     NO HISTORY OF SURGERY         Social History:  The patient works in patient [employer] here at the [employer]. The patient denies use of tanning beds.    Family History:  There is no family history of melanoma or psoriasis.    Medications:  Current Outpatient Medications   Medication Sig Dispense Refill     augmented betamethasone dipropionate (DIPROLENE-AF) 0.05 % external ointment Apply twice per day as needed to rash on the legs 50 g 3     cycloSPORINE (RESTASIS) 0.05 % ophthalmic emulsion Place 1 drop into both eyes 2 times daily 1 Box 11     neomycin-polymixin-dexamethasone (MAXITROL) 0.1 % ophthalmic suspension Use three times daily for one week both eyes and taper to two times daily for an additional week and discontinue 5 mL 0     terbinafine (LAMISIL AT) 1 % cream Apply twice daily to rash on bottom of feet for 2 weeks 12 g 1        Allergies   Allergen Reactions     Latex      Patient does not think she is allergic to latex          Review of Systems:  -As per HPI  -Constitutional: The patient denies fatigue, fevers, chills, unintended weight loss, and night sweats.  -Skin: As above in HPI. No additional skin concerns.    Physical exam:  GEN: This is a well developed, well-nourished female in no acute distress, in a pleasant mood.    SKIN: Focused examination of bilateral lower extremities and elbows, back, face and neck were performed.  -Well-demarcated pink lichenified plaque, mildly violaceous with desquamatous scaled and very well defined border on bilateral instep extending to top of foot  bilaterally  -Poorly demarcated pink papular plaque that is patchy throughout the entire lower extremity. More lichenified plaque on the lateral ankle on the L. Prurigo like nodule, eroded, on the proximal anterior lower leg and a lichenified plaque x2 on the lateral foot.  -Lichenified plaque on the R anterior ankle  -Lichenified plaques x3 on R lower extremity with eczematous pink poorly demarcated plaques extending into one large plaque extending into lower leg  -Lichenified plaques on the bilateral elbows extending in a poorly demarcated fashion on the proximal forearm.   -Scarred white macules on the bilateral upper extremities  -Scratch test with a bit of erythema but no urticarial reaction  -No other lesions of concern on areas examined.     Impression/Plan:   1. 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 Eczematous eruption + lichen simplex chronicus, lower extremities and elbow  The eruption on the legs is clearly eczematous, while the lesions on the feet appear more papulosquamous. I previosuly though the lesions on the feet were tinea, but they did not respond to antifungal cream and they do not look like tinea much more psoriasiform today.  The plaques on the elbows morphologically look more like dermatitis, but the location fits better for psoriasis  A punch biopsy from the plaques on the elbow was perfromed and sent for histologic examtion.    Punch Biopsy Procedure Note:The risks and benefits of the procedure were described to the patient. These include but are not limited to bleeding, infection, scar, incomplete removal, and non-diagnostic biopsy. Written informed consent was obtained. The L elbow region was cleansed with an alcohol pad and injected with lidocaine with epinephrine (<1mL used). Once anesthesia was obtained, a 4.0mm punch biopsy was performed. The tissue was placed in a labeled container with formalin  and sent to pathology. The site was closed using two simple interrupted 4-0 Prolene sutures. Vaseline and a bandage were applied to the wound. The patient tolerated the procedure well and was given post biopsy care instructions.    Continue betamethasone diproprionate 0.05% ointment BID for 2-4 weeks.    Due to the diffuse eczematous eruption on the legs, will refer to Dr. Rich for patch testing. The plaques on the elbows could be an ID reaction    I still think the feet lesions are psoriasiform    Due to the diffuse nature of the eruption, will also have her start nUVB TIW    CC Dr. Long on close of this encounter.    Will have the patient follow-up with Dr Rich and follow-up with myself if needed.    Dr. Dimas staffed the patient.    Staff Involved:    Scribe Disclosure  I, Britney Brandan, am serving as a scribe to document services personally performed by Dr. Nehemias Dimas, based on data collection and the provider's statements to me.     Provider Disclosure:   The documentation recorded by the scribe accurately reflects the services I personally performed and the decisions made by me.    Nehemias Dimas MD, FAAD    Departments of Internal Medicine and Dermatology  [facility]  [phone]

## 2019-11-14 NOTE — LETTER
11/14/2019       RE: Rochelle Gill  [address] [place] [address]     Dear Colleague,    Thank you for referring your patient, Rochelle Gill, to the [facility] DERMATOLOGY at [facility]. Please see a copy of my visit note below.    [facility] Dermatology Note    Dermatology Clinic  [facility]  [address] [place] [id number]    Dermatology Problem List:  1. Lichen simplex chronicus and eczematous eruption on legs  2. 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        History of Present Illness:  Ms. Rochelle Gill is a 54 year old female who presents for followup of a rash. The patient was last seen in clinic 9/21/17 for evaluation of a rash. At the time she was to begin betamethasone diproprionate 0.05% ointment BID for 2-4 weeks and return as needed.   Today the patient reports that she hasn't experienced much improvement in the interim. Reports that she has been having, isolated to the lower extremities and now the elbows bilaterally, the same symptoms. This has been going on since about 10 years ago.   Reports that the betamethasone diproprionate has helped. Although it doesn't resolve issues, it does help to clear affected areas for short periods of time. Reports that she wears gloves to bed, doesn't touch or scratch the rash, but reports ongoing sensitivity and tenderness. Denies pruritus.      States that she had a lot of pain and stiffness in the bilateral ankles and saw the rheumatologist; this was about 2 years ago and she had a Bx of the synovial fluid. Reports not significant findings. Notes that this is intermittent.    Showers and uses baking soda or epsom salt in bath. Uses a mineral oil and completely non-scented, and Free and  Clear. Notes increased irritability with lack of sleep and stress. Uses no shaving cream. Not on any medications.  Notes she had allergy testing about 15 years ago at [place]-- notably to mold, dust, and pollen. No eczema as a child.    The patient is otherwise feeling well overall. No other skin concerns at this time.      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 The patient denies use of tanning beds.    Family History:  There is no family history of melanoma or psoriasis.    Medications:  Current Outpatient Medications   Medication Sig Dispense Refill     augmented betamethasone dipropionate (DIPROLENE-AF) 0.05 % external ointment Apply twice per day as needed to rash on the legs 50 g 3     cycloSPORINE (RESTASIS) 0.05 % ophthalmic emulsion Place 1 drop into both eyes 2 times daily 1 Box 11     neomycin-polymixin-dexamethasone (MAXITROL) 0.1 % ophthalmic suspension Use three times daily for one week both eyes and taper to two times daily for an additional week and discontinue 5 mL 0     terbinafine (LAMISIL AT) 1 % cream Apply twice daily to rash on bottom of feet for 2 weeks 12 g 1        Allergies   Allergen Reactions     Latex      Patient does not think she is allergic to latex          Review of Systems:  -As per HPI  -Constitutional: The patient denies fatigue, fevers, chills, unintended weight loss, and night sweats.  -Skin: As above in HPI. No additional skin concerns.    Physical exam:  GEN: This is a well developed, well-nourished female in no acute distress, in a pleasant mood.     SKIN: Focused examination of bilateral lower extremities and elbows, back, face and neck were performed.  -Well-demarcated pink lichenified plaque, mildly violaceous with desquamatous scaled and very well defined border on bilateral instep extending to top of foot bilaterally  -Poorly demarcated pink papular plaque that is patchy throughout the entire lower extremity. More lichenified plaque on the lateral ankle on the L. Prurigo like nodule, eroded, on the proximal anterior lower leg and a lichenified plaque x2 on the lateral foot.  -Lichenified plaque on the R anterior ankle  -Lichenified plaques x3 on R lower extremity with eczematous pink poorly demarcated plaques extending into one large plaque extending into lower leg  -Lichenified plaques on the bilateral elbows extending in a poorly demarcated fashion on the proximal forearm.   -Scarred white macules on the bilateral upper extremities  -Scratch test with a bit of erythema but no urticarial reaction  -No other lesions of concern on areas examined.     Impression/Plan:   1. 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 Eczematous eruption + lichen simplex chronicus, lower extremities and elbow  The eruption on the legs is clearly eczematous, while the lesions on the feet appear more papulosquamous. I previosuly though the lesions on the feet were tinea, but they did not respond to antifungal cream and they do not look like tinea much more psoriasiform today.  The plaques on the elbows morphologically look more like dermatitis, but the location fits better for psoriasis  A punch biopsy from the plaques on the elbow was perfromed and sent for histologic examtion.    Punch Biopsy Procedure Note:The risks and benefits of the procedure were described to the patient. These include but are not limited to bleeding, infection, scar, incomplete removal, and non-diagnostic biopsy. Written  informed consent was obtained. The L elbow region was cleansed with an alcohol pad and injected with lidocaine with epinephrine (<1mL used). Once anesthesia was obtained, a 4.0mm punch biopsy was performed. The tissue was placed in a labeled container with formalin and sent to pathology. The site was closed using two simple interrupted 4-0 Prolene sutures. Vaseline and a bandage were applied to the wound. The patient tolerated the procedure well and was given post biopsy care instructions.    Continue betamethasone diproprionate 0.05% ointment BID for 2-4 weeks.    Due to the diffuse eczematous eruption on the legs, will refer to Dr. Rich for patch testing. The plaques on the elbows could be an ID reaction    I still think the feet lesions are psoriasiform    Due to the diffuse nature of the eruption, will also have her start nUVB TIW    CC Dr. Long on close of this encounter.    Will have the patient follow-up with Dr Rich and follow-up with myself if needed.    Dr. Dimas staffed the patient.    Staff Involved:    Scribe Disclosure  I, Britney Gipson, am serving as a scribe to document services personally performed by Dr. Nehemias Dimas, based on data collection and the provider's statements to me.     Provider Disclosure:   The documentation recorded by the scribe accurately reflects the services I personally performed and the decisions made by me.    Nehemias Dimas MD, FAAD    Departments of Internal Medicine and Dermatology  [facility]  [phone]                Pictures were placed in Pt's chart today for future reference.

## 2019-11-18 LAB — COPATH REPORT: NORMAL

## 2019-11-25 NOTE — TELEPHONE ENCOUNTER
FUTURE VISIT INFORMATION      FUTURE VISIT INFORMATION:    Date: 12.4.19    Time: 12:00    Location:  [facility]  REFERRAL INFORMATION:    Referring provider:  Dr. Dimas    Referring providers clinic:   [facility]    Reason for visit/diagnosis  New Allergy- Wing pt     RECORDS REQUESTED FROM:       Clinic name Comments Records Status Photos Status    [facility] 11.14.19, 9.21.17 Dr. Dimas [facility]

## 2019-12-04 ENCOUNTER — PRE VISIT (OUTPATIENT)
Dept: ALLERGY | Facility: CLINIC | Age: 54
End: 2019-12-04

## 2020-02-17 ENCOUNTER — OFFICE VISIT (OUTPATIENT)
Dept: OPTOMETRY | Facility: CLINIC | Age: 55
End: 2020-02-17
Payer: COMMERCIAL

## 2020-02-17 DIAGNOSIS — H52.4 PRESBYOPIA: ICD-10-CM

## 2020-02-17 DIAGNOSIS — H04.129 DRY EYE: ICD-10-CM

## 2020-02-17 DIAGNOSIS — H52.03 HYPEROPIA OF BOTH EYES WITH ASTIGMATISM: Primary | ICD-10-CM

## 2020-02-17 DIAGNOSIS — H52.203 HYPEROPIA OF BOTH EYES WITH ASTIGMATISM: Primary | ICD-10-CM

## 2020-02-17 PROCEDURE — 92015 DETERMINE REFRACTIVE STATE: CPT | Performed by: OPTOMETRIST

## 2020-02-17 PROCEDURE — 92014 COMPRE OPH EXAM EST PT 1/>: CPT | Performed by: OPTOMETRIST

## 2020-02-17 ASSESSMENT — CUP TO DISC RATIO
OD_RATIO: 0.55
OS_RATIO: 0.5

## 2020-02-17 ASSESSMENT — REFRACTION_MANIFEST
OS_CYLINDER: +0.75
OD_ADD: +2.25
OS_ADD: +2.25
OD_AXIS: 103
OD_CYLINDER: +0.25
OS_CYLINDER: SPHERE
OS_SPHERE: +1.00
OS_AXIS: 060
OD_SPHERE: +1.25
OS_SPHERE: +1.75
OD_SPHERE: +1.50
METHOD_AUTOREFRACTION: 1
OD_CYLINDER: SPHERE

## 2020-02-17 ASSESSMENT — CONF VISUAL FIELD
METHOD: COUNTING FINGERS
OD_NORMAL: 1
OS_NORMAL: 1

## 2020-02-17 ASSESSMENT — VISUAL ACUITY
OS_SC: 20/120
OD_SC: 20/120
METHOD: SNELLEN - LINEAR
OS_SC: 20/40
OD_SC: 20/40
OS_SC+: +1
OD_SC+: +2

## 2020-02-17 ASSESSMENT — TONOMETRY
OD_IOP_MMHG: 16
IOP_METHOD: APPLANATION
OS_IOP_MMHG: 16

## 2020-02-17 ASSESSMENT — EXTERNAL EXAM - RIGHT EYE: OD_EXAM: NORMAL

## 2020-02-17 ASSESSMENT — EXTERNAL EXAM - LEFT EYE: OS_EXAM: NORMAL

## 2020-02-17 NOTE — PATIENT INSTRUCTIONS
Refill restasis for now continue two times daily , use artificial tears more frequent,qid for at least a couple of weeks   could add gel at bedtime in lower lid such as genteal gel  Set up appointment w/ dry eye clinic at [facility]     Meibomian gland dysfunction or Posterior Blepharitis, is characterized by inflammation along both the uppper and lower eyelid margins. A single row of these glands is present in each lid with openings along the lid margins.  It is often found in association with skin conditions such as rosacea and seborrheic dermatitis.    Symptoms include:  ?Red eyes  ?Gritty or burning sensation  ?Excessive tearing  ?Itchy eyelids  ?Red, swollen eyelids  ?Crusting or matting of eyelashes in the morning  ?Light sensitivity  ?Blurred vision    It is important to keep cosmetics from blocking these oil glands. If blocked, they do not   excrete oil into the tear film, which causes the tears to evaporate quickly.   This may result in watery eyes.  There is also an increase of bacterial growth when the tear film is unstable, leading to further ocular surface inflammation.    Warm compresses are very beneficial to the normal functioning of the eye.  Warm compresses for 5-10 minutes twice daily and keeping the lid margins clean  and help maintain a good tear film.   Moisten a washcloth with hot water, or microwave for 10 seconds, being careful to not get the cloth too hot.   Then put the washcloth onto your eyelids for 5 minutes. It will cool quickly so a rice pack or eyemask that can be heated and laid on top of the washcloth will help retain the heat.    Omega 3 fatty acid supplements taken once to twice daily and artificial tears such as Soothe xp, Refresh optive , Retaine and systane balance are also an additional treatment to control inflammation and help soothe your eyes.      DRY EYE TREATMENT    I recommend using artificial tears for your dry eye. There are over the counter  drops that work well and may be used up to 4x daily. ( systane balance, refresh optive, soothe xp)   If you need more than 4 drops daily, use a preservative free product which come in individual vials which may be used for 24 hours and discarded.     Artificial tears work best as a preventative and not as well after your eyes are starting to bother you.  It may take 4- 6 weeks of using the drops before you notice improvement.  If after that time you are still having problems schedule an appointment for an evaluation and discussion of different treatments.  Dry eyes are a chronic condition and you may have more symptoms at certain times of the year.      Additional recommended treatment:  Warm compresses once to twice daily for 5-10 minutes    Directions for warm soaks  There are few methods for hot compresses. Moisten a washcloth with hot water, or microwave for 10 seconds, being careful to not get the cloth too hot.   Then put the washcloth onto your eyelids for 5 minutes. It will cool quickly so a rice pack or eyemask that can be heated and laid on top of the washcloth will help retain the heat.          Omega 3 fatty acid supplements taken 1-2x daily  Recommend  at least  2000mg omega 3  800 EPA  600 DHA    Blink regularly  Stay hydrated  Increase humidity  Wear sunglasses   Avoid direct exposure to forced air--turn air vents in the car away and keep fans from blowing directly on your face

## 2020-02-17 NOTE — PROGRESS NOTES
Chief Complaint   Patient presents with     Annual Eye Exam      DENICE: 01/2019  Continues to struggle with dryness - using warm compresses, OTC drops, Restasis x 1y   No improvement whatsoever.   Wears cheaters. Nothing for DVA.  No other concerns.     Last Eye Exam: 2019  Dilated Previously: Yes    What are you currently using to see?  readers       Distance Vision Acuity: Satisfied with vision    Near Vision Acuity: Satisfied with vision while reading and using computer with readers    Eye Comfort: dry  Do you use eye drops? 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       OBJECTIVE: See Ophthalmology exam    ASSESSMENT:    ICD-10-CM    1. Hyperopia of both eyes with astigmatism H52.03     H52.203    2. Presbyopia H52.4       PLAN:   Refill restasis for now, continue two times daily , use artificial tears more frequent, could add gel at bedtime     Referral to [facility] to dry eye clinic  Could try MG expression/ punctal plugs   Lizbet Coffey OD

## 2020-02-17 NOTE — LETTER
2/17/2020         RE: Rochelle Gill  [address] [place] [address]        Dear Colleague,    Thank you for referring your patient, Rochelle Gill, to the [facility]. Please see a copy of my visit note below.    Chief Complaint   Patient presents with     Annual Eye Exam      DENICE: 01/2019  Continues to struggle with dryness - using warm compresses, OTC drops, Restasis x 1y   No improvement whatsoever.   Wears cheaters. Nothing for DVA.  No other concerns.     Last Eye Exam: 2019  Dilated Previously: Yes    What are you currently using to see?  readers       Distance Vision Acuity: Satisfied with vision    Near Vision Acuity: Satisfied with vision while reading and using computer with readers    Eye Comfort: dry  Do you use eye drops? 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       OBJECTIVE: See Ophthalmology exam    ASSESSMENT:    ICD-10-CM    1. Hyperopia of both eyes with astigmatism H52.03     H52.203    2. Presbyopia H52.4       PLAN:   Refill restasis for now, continue two times daily , use artificial tears more frequent, could add gel at bedtime     Referral to [facility] to dry eye clinic  Could try MG expression/ punctal plugs   Lizbet Coffey OD     Again, thank you for allowing me to participate in the care of your patient.        Sincerely,        Lizbet Coffey, OD

## 2020-03-20 DIAGNOSIS — H16.223 KERATOCONJUNCTIVITIS SICCA DUE TO DECREASED TEAR PRODUCTION, BILATERAL: ICD-10-CM

## 2020-03-20 DIAGNOSIS — H04.129 DRY EYE: ICD-10-CM

## 2020-03-20 RX ORDER — CYCLOSPORINE 0.5 MG/ML
1 EMULSION OPHTHALMIC 2 TIMES DAILY
Qty: 1 BOX | Refills: 11 | Status: SHIPPED | OUTPATIENT
Start: 2020-03-20

## 2020-03-20 NOTE — TELEPHONE ENCOUNTER
Patient would like Restasis script sent to [facility]    Loree Lee on 3/20/2020 at 11:11 AM  [facility]  This will not go through on cover my meds, a PA was initiated, and called them and the system can not find the member ID.  Called Rochelle on her mobile voice mail is full, left message on the work number to call us to verify member ID.    Lizbet Coffey OD     I personally spent over an hour on this prior auth.  CALLED [facility] direct   2/19 was initial start date of Restasis  Two times daily each eye for dx H01.123  Case #[id number]  Phone [phone]

## 2020-03-23 NOTE — TELEPHONE ENCOUNTER
PA approved from [facility] 3/23/2020  Good through 03/20/2021    Case [id number] 3/20/2020    Lizbet Coffey OD

## 2020-05-29 ENCOUNTER — ANCILLARY PROCEDURE (OUTPATIENT)
Dept: MAMMOGRAPHY | Facility: CLINIC | Age: 55
End: 2020-05-29
Attending: INTERNAL MEDICINE
Payer: COMMERCIAL

## 2020-05-29 DIAGNOSIS — Z12.31 VISIT FOR SCREENING MAMMOGRAM: ICD-10-CM

## 2020-05-29 PROCEDURE — 77063 BREAST TOMOSYNTHESIS BI: CPT | Mod: TC

## 2020-05-29 PROCEDURE — 77067 SCR MAMMO BI INCL CAD: CPT | Mod: TC

## 2020-11-25 ENCOUNTER — TELEPHONE (OUTPATIENT)
Dept: DERMATOLOGY | Facility: CLINIC | Age: 55
End: 2020-11-25

## 2020-11-25 DIAGNOSIS — R21 RASH: ICD-10-CM

## 2020-11-25 DIAGNOSIS — L28.0 LICHEN SIMPLEX CHRONICUS: ICD-10-CM

## 2020-11-25 RX ORDER — BETAMETHASONE DIPROPIONATE 0.5 MG/G
OINTMENT, AUGMENTED TOPICAL
Qty: 50 G | Refills: 0 | Status: SHIPPED | OUTPATIENT
Start: 2020-11-25

## 2020-11-25 NOTE — TELEPHONE ENCOUNTER
[facility] Call Center    Phone Message    May a detailed message be left on voicemail: yes     Reason for Call: Medication Refill Request    Has the patient contacted the pharmacy for the refill? Yes   Name of medication being requested: augmented betamethasone dipropionate (DIPROLENE-AF) 0.05 % external ointment    Provider who prescribed the medication: Dr Marie     Pharmacy: [facility] [place] [place] - [address]     Date medication is needed: asap       Action Taken: Message routed to:  [facility] derm    Travel Screening: Not Applicable

## 2020-11-25 NOTE — TELEPHONE ENCOUNTER
Centralized Medication Refill Team note:   augmented betamethasone dipropionate (DIPROLENE-AF) 0.05 % external ointment   Last Written Prescription Date:  11/14/2019  Last Fill Quantity: 50 g ,   # refills: 3  Last Office Visit : 11/14/2019  Future Office visit:  None   refill process #1> preethi refill sent.  Scheduling has been notified to contact the pt for appointment.  Your refill has been approved and sent to your pharmacy.

## 2020-12-06 ENCOUNTER — HEALTH MAINTENANCE LETTER (OUTPATIENT)
Age: 55
End: 2020-12-06

## 2021-09-26 ENCOUNTER — HEALTH MAINTENANCE LETTER (OUTPATIENT)
Age: 56
End: 2021-09-26

## 2022-01-15 ENCOUNTER — HEALTH MAINTENANCE LETTER (OUTPATIENT)
Age: 57
End: 2022-01-15

## 2022-08-27 ENCOUNTER — HEALTH MAINTENANCE LETTER (OUTPATIENT)
Age: 57
End: 2022-08-27

## 2022-09-15 ENCOUNTER — APPOINTMENT (RX ONLY)
Dept: URBAN - METROPOLITAN AREA CLINIC 170 | Facility: CLINIC | Age: 57
Setting detail: DERMATOLOGY
End: 2022-09-15

## 2022-09-15 DIAGNOSIS — L40.0 PSORIASIS VULGARIS: ICD-10-CM

## 2022-09-15 PROCEDURE — ? TREATMENT REGIMEN

## 2022-09-15 PROCEDURE — ? PRESCRIPTION

## 2022-09-15 PROCEDURE — ? COUNSELING

## 2022-09-15 PROCEDURE — 99203 OFFICE O/P NEW LOW 30 MIN: CPT

## 2022-09-15 RX ORDER — BETAMETHASONE DIPROPIONATE 0.5 MG/G
OINTMENT TOPICAL
Qty: 45 | Refills: 6 | Status: ERX | COMMUNITY
Start: 2022-09-15

## 2022-09-15 RX ADMIN — BETAMETHASONE DIPROPIONATE: 0.5 OINTMENT TOPICAL at 00:00

## 2022-09-15 NOTE — PROCEDURE: TREATMENT REGIMEN
Detail Level: Zone
Other Instructions: Patient prefers not to use systemic therapies
Action 3: Continue
Continue Regimen: Betamethasone 0.05% ointment- Apply to the affected areas on the legs with occlusion nightly for up to 2 weeks

## 2022-10-10 ENCOUNTER — RX ONLY (OUTPATIENT)
Age: 57
Setting detail: RX ONLY
End: 2022-10-10

## 2022-10-10 RX ORDER — FLUOCINONIDE 0.5 MG/G
OINTMENT TOPICAL
Qty: 60 | Refills: 4 | Status: ERX | COMMUNITY
Start: 2022-10-10

## 2022-12-22 ENCOUNTER — RX ONLY (OUTPATIENT)
Age: 57
Setting detail: RX ONLY
End: 2022-12-22

## 2022-12-22 ENCOUNTER — APPOINTMENT (RX ONLY)
Dept: URBAN - METROPOLITAN AREA CLINIC 170 | Facility: CLINIC | Age: 57
Setting detail: DERMATOLOGY
End: 2022-12-22

## 2022-12-22 DIAGNOSIS — L40.0 PSORIASIS VULGARIS: ICD-10-CM | Status: IMPROVED

## 2022-12-22 PROCEDURE — ? PRESCRIPTION

## 2022-12-22 PROCEDURE — ? COUNSELING

## 2022-12-22 PROCEDURE — 99213 OFFICE O/P EST LOW 20 MIN: CPT

## 2022-12-22 PROCEDURE — ? TREATMENT REGIMEN

## 2022-12-22 RX ORDER — TAZAROTENE 0.1 MG/G
CREAM CUTANEOUS
Qty: 60 | Refills: 5 | Status: ERX | COMMUNITY
Start: 2022-12-22

## 2022-12-22 RX ORDER — BETAMETHASONE DIPROPIONATE 0.5 MG/G
OINTMENT TOPICAL
Qty: 45 | Refills: 6 | Status: ERX

## 2022-12-22 RX ADMIN — TAZAROTENE: 0.1 CREAM CUTANEOUS at 00:00

## 2022-12-22 NOTE — PROCEDURE: TREATMENT REGIMEN
Start Regimen: Tazorac 0.1% cream- Apply to the legs every night
Action 4: Continue
Detail Level: Zone
Continue Regimen: Betamethasone

## 2022-12-28 ENCOUNTER — RX ONLY (OUTPATIENT)
Age: 57
Setting detail: RX ONLY
End: 2022-12-28

## 2022-12-28 RX ORDER — FLUOCINONIDE 0.5 MG/G
OINTMENT TOPICAL
Qty: 60 | Refills: 6 | Status: ERX

## 2023-04-20 ENCOUNTER — APPOINTMENT (RX ONLY)
Dept: URBAN - METROPOLITAN AREA CLINIC 170 | Facility: CLINIC | Age: 58
Setting detail: DERMATOLOGY
End: 2023-04-20

## 2023-04-20 ENCOUNTER — RX ONLY (OUTPATIENT)
Age: 58
Setting detail: RX ONLY
End: 2023-04-20

## 2023-04-20 DIAGNOSIS — L40.0 PSORIASIS VULGARIS: ICD-10-CM | Status: STABLE

## 2023-04-20 PROCEDURE — 99213 OFFICE O/P EST LOW 20 MIN: CPT

## 2023-04-20 PROCEDURE — ? COUNSELING

## 2023-04-20 PROCEDURE — ? PRESCRIPTION

## 2023-04-20 PROCEDURE — ? TREATMENT REGIMEN

## 2023-04-20 RX ORDER — TAZAROTENE 0.1 MG/G
CREAM CUTANEOUS
Qty: 60 | Refills: 5 | Status: ERX

## 2023-04-20 RX ORDER — FLUOCINONIDE 0.5 MG/G
OINTMENT TOPICAL
Qty: 60 | Refills: 6 | Status: CANCELLED
Stop reason: CLARIF

## 2023-04-20 RX ORDER — CLOBETASOL PROPIONATE 0.5 MG/G
OINTMENT TOPICAL
Qty: 60 | Refills: 5 | Status: ERX | COMMUNITY
Start: 2023-04-20

## 2023-04-20 RX ADMIN — CLOBETASOL PROPIONATE: 0.5 OINTMENT TOPICAL at 00:00

## 2023-04-20 NOTE — PROCEDURE: TREATMENT REGIMEN
Start Regimen: Clobetasol 0.05% ointment-
Action 4: Continue
Detail Level: Zone
Continue Regimen: Tazorac 0.1% cream- on the weekend when flaring and every weekday when not flaring
Otc Regimen: Urea 40% cream twice a day when the plaques are thick

## 2023-04-23 ENCOUNTER — HEALTH MAINTENANCE LETTER (OUTPATIENT)
Age: 58
End: 2023-04-23

## 2023-06-15 ENCOUNTER — APPOINTMENT (RX ONLY)
Dept: URBAN - METROPOLITAN AREA CLINIC 170 | Facility: CLINIC | Age: 58
Setting detail: DERMATOLOGY
End: 2023-06-15

## 2023-06-15 DIAGNOSIS — L40.0 PSORIASIS VULGARIS: ICD-10-CM

## 2023-06-15 PROCEDURE — ? COUNSELING

## 2023-06-15 PROCEDURE — ? TREATMENT REGIMEN

## 2023-06-15 PROCEDURE — ? PSORIATIC EPIDEMIOLOGY SCREENING TOOL (PEST)

## 2023-06-15 PROCEDURE — 99213 OFFICE O/P EST LOW 20 MIN: CPT

## 2023-06-15 NOTE — PROCEDURE: TREATMENT REGIMEN
Action 4: Continue
Detail Level: Zone
Continue Regimen: Tazorac 0.1% cream- every night\\n\\nClobetasol 0.05% ointment- Apply to affected areas of rash on legs 2x/wk when stable, 5x/week when flaring
Otc Regimen: Urea 40% cream twice a day when the plaques are thick

## 2023-06-15 NOTE — PROCEDURE: PSORIATIC EPIDEMIOLOGY SCREENING TOOL (PEST)
Have You Ever Had A Swollen Joint?: Yes
Detail Level: Simple
Have You Ever Had A Finger Or Toe That Was Completely Swollen And Painful For No Apparent Reason?: No
Positive Screening Text: A score of 3 or greater is considered a positive PEST score.
Negative Screening Text: A score of less than 3 is considered a negative PEST score.

## 2023-12-15 ENCOUNTER — APPOINTMENT (RX ONLY)
Dept: URBAN - METROPOLITAN AREA CLINIC 170 | Facility: CLINIC | Age: 58
Setting detail: DERMATOLOGY
End: 2023-12-15

## 2023-12-15 DIAGNOSIS — L40.0 PSORIASIS VULGARIS: ICD-10-CM | Status: IMPROVED

## 2023-12-15 PROCEDURE — 99213 OFFICE O/P EST LOW 20 MIN: CPT

## 2023-12-15 PROCEDURE — ? PRESCRIPTION

## 2023-12-15 PROCEDURE — ? TREATMENT REGIMEN

## 2023-12-15 PROCEDURE — ? COUNSELING

## 2023-12-15 RX ORDER — CLOBETASOL PROPIONATE 0.5 MG/G
OINTMENT TOPICAL
Qty: 60 | Refills: 5 | Status: ERX

## 2023-12-15 RX ORDER — TAZAROTENE 0.1 MG/G
CREAM CUTANEOUS
Qty: 60 | Refills: 5 | Status: ERX

## 2023-12-15 ASSESSMENT — LOCATION DETAILED DESCRIPTION DERM
LOCATION DETAILED: RIGHT DISTAL PRETIBIAL REGION
LOCATION DETAILED: RIGHT PROXIMAL PRETIBIAL REGION
LOCATION DETAILED: LEFT DISTAL PRETIBIAL REGION
LOCATION DETAILED: LEFT PROXIMAL PRETIBIAL REGION

## 2023-12-15 ASSESSMENT — LOCATION ZONE DERM: LOCATION ZONE: LEG

## 2023-12-15 ASSESSMENT — LOCATION SIMPLE DESCRIPTION DERM
LOCATION SIMPLE: LEFT PRETIBIAL REGION
LOCATION SIMPLE: RIGHT PRETIBIAL REGION

## 2024-11-22 ENCOUNTER — RX ONLY (OUTPATIENT)
Age: 59
Setting detail: RX ONLY
End: 2024-11-22

## 2024-11-22 ENCOUNTER — APPOINTMENT (RX ONLY)
Dept: URBAN - METROPOLITAN AREA CLINIC 170 | Facility: CLINIC | Age: 59
Setting detail: DERMATOLOGY
End: 2024-11-22

## 2024-11-22 DIAGNOSIS — L40.0 PSORIASIS VULGARIS: ICD-10-CM | Status: STABLE

## 2024-11-22 PROCEDURE — 99213 OFFICE O/P EST LOW 20 MIN: CPT

## 2024-11-22 PROCEDURE — ? COUNSELING

## 2024-11-22 PROCEDURE — ? PSORIATIC EPIDEMIOLOGY SCREENING TOOL (PEST)

## 2024-11-22 PROCEDURE — ? TREATMENT REGIMEN

## 2024-11-22 RX ORDER — CLOBETASOL PROPIONATE 0.5 MG/G
OINTMENT TOPICAL
Qty: 60 | Refills: 5 | Status: ERX

## 2024-11-22 RX ORDER — TAZAROTENE 0.1 MG/G
CREAM CUTANEOUS
Qty: 60 | Refills: 5 | Status: ERX

## 2024-11-22 ASSESSMENT — LOCATION SIMPLE DESCRIPTION DERM
LOCATION SIMPLE: LEFT PRETIBIAL REGION
LOCATION SIMPLE: RIGHT PRETIBIAL REGION

## 2024-11-22 ASSESSMENT — LOCATION ZONE DERM: LOCATION ZONE: LEG

## 2024-11-22 NOTE — PROCEDURE: TREATMENT REGIMEN
Action 4: Continue
Plan: Discussed getting 10-15 minutes of sunlight daily to affected areas\\nIf psoriasis worsens, would then consider ILK injections vs. systemic treatment
Detail Level: Zone
Continue Regimen: Tazorac 0.1% cream- every night\\n\\nClobetasol 0.05% ointment- Apply to affected areas of rash on legs with occlusion - 2x/wk when stable, 5x/week when flaring

## 2024-11-22 NOTE — PROCEDURE: PSORIATIC EPIDEMIOLOGY SCREENING TOOL (PEST)
Have You Ever Had A Swollen Joint?: No
Positive Screening Text: A score of 3 or greater is considered a positive PEST score.
Negative Screening Text: A score of less than 3 is considered a negative PEST score.
Has A Doctor Ever Told You That You Have Arthritis?: Yes
Detail Level: Simple